# Patient Record
Sex: FEMALE | Race: WHITE | HISPANIC OR LATINO | Employment: OTHER | URBAN - METROPOLITAN AREA
[De-identification: names, ages, dates, MRNs, and addresses within clinical notes are randomized per-mention and may not be internally consistent; named-entity substitution may affect disease eponyms.]

---

## 2017-01-25 ENCOUNTER — OFFICE VISIT (OUTPATIENT)
Dept: URGENT CARE | Facility: PHYSICIAN GROUP | Age: 27
End: 2017-01-25
Payer: COMMERCIAL

## 2017-01-25 VITALS
WEIGHT: 135 LBS | HEART RATE: 78 BPM | BODY MASS INDEX: 24.84 KG/M2 | TEMPERATURE: 100.3 F | HEIGHT: 62 IN | OXYGEN SATURATION: 96 % | DIASTOLIC BLOOD PRESSURE: 70 MMHG | RESPIRATION RATE: 16 BRPM | SYSTOLIC BLOOD PRESSURE: 120 MMHG

## 2017-01-25 DIAGNOSIS — J10.1 INFLUENZA A: ICD-10-CM

## 2017-01-25 PROCEDURE — 99204 OFFICE O/P NEW MOD 45 MIN: CPT | Performed by: PHYSICIAN ASSISTANT

## 2017-01-25 RX ORDER — OSELTAMIVIR PHOSPHATE 75 MG/1
75 CAPSULE ORAL 2 TIMES DAILY
Qty: 10 CAP | Refills: 0 | Status: SHIPPED | OUTPATIENT
Start: 2017-01-25 | End: 2017-09-18

## 2017-01-25 RX ORDER — CODEINE PHOSPHATE AND GUAIFENESIN 10; 100 MG/5ML; MG/5ML
5 SOLUTION ORAL EVERY 4 HOURS PRN
Qty: 100 ML | Refills: 0 | Status: SHIPPED | OUTPATIENT
Start: 2017-01-25 | End: 2017-09-18

## 2017-01-25 ASSESSMENT — ENCOUNTER SYMPTOMS
PALPITATIONS: 0
SHORTNESS OF BREATH: 0
FEVER: 1
HEMOPTYSIS: 0
WHEEZING: 0
CHILLS: 1
SWEATS: 1
COUGH: 1
SORE THROAT: 1
SPUTUM PRODUCTION: 1
HEADACHES: 1

## 2017-01-25 ASSESSMENT — COPD QUESTIONNAIRES: COPD: 0

## 2017-01-25 NOTE — MR AVS SNAPSHOT
"Windy Funk   2017 3:45 PM   Office Visit   MRN: [id number]    Department:  [facility]   Dept Phone:  [phone]    Description:  Female : 1990   Provider:  Fuad Wiseman PA-C           Reason for Visit     Cough cough 2-3 days, fever      Allergies as of 2017     Allergen Noted Reactions    Percocet [Oxycodone-Acetaminophen] 2017   Hives      You were diagnosed with     Influenza A   [[id number]]         Vital Signs     Blood Pressure Pulse Temperature Respirations Height Weight    120/70 mmHg 78 37.9 °C (100.3 °F) 16 1.575 m (5' 2.01\") 61.236 kg (135 lb)    Body Mass Index Oxygen Saturation                24.69 kg/m2 96%          Basic Information     Date Of Birth Sex Race Ethnicity Preferred Language    1990 Female  or  Non- English      Health Maintenance     Patient has no pending health maintenance at this time      Current Immunizations     No immunizations on file.      Below and/or attached are the medications your provider expects you to take. Review all of your home medications and newly ordered medications with your provider and/or pharmacist. Follow medication instructions as directed by your provider and/or pharmacist. Please keep your medication list with you and share with your provider. Update the information when medications are discontinued, doses are changed, or new medications (including over-the-counter products) are added; and carry medication information at all times in the event of emergency situations     Allergies:  PERCOCET - Hives               Medications  Valid as of: 2017 -  6:11 PM    Generic Name Brand Name Tablet Size Instructions for use    Guaifenesin-Codeine (Solution) ROBITUSSIN -10 mg/5mL Take 5 mL by mouth every four hours as needed for Cough.        Oseltamivir Phosphate (Cap) TAMIFLU 75 MG Take 1 Cap by mouth 2 times a day.        .                 Medicines prescribed today " were sent to:     [id number] - [place], [place] - [id number] [facility] [place] [address]    Phone: [phone] Fax: [phone]    Open 24 Hours?: No      Medication refill instructions:       If your prescription bottle indicates you have medication refills left, it is not necessary to call your provider’s office. Please contact your pharmacy and they will refill your medication.    If your prescription bottle indicates you do not have any refills left, you may request refills at any time through one of the following ways: The online [employer] system (except Urgent Care), by calling your provider’s office, or by asking your pharmacy to contact your provider’s office with a refill request. Medication refills are processed only during regular business hours and may not be available until the next business day. Your provider may request additional information or to have a follow-up visit with you prior to refilling your medication.   *Please Note: Medication refills are assigned a new Rx number when refilled electronically. Your pharmacy may indicate that no refills were authorized even though a new prescription for the same medication is available at the pharmacy. Please request the medicine by name with the pharmacy before contacting your provider for a refill.           [employer] Access Code: [email or web address]  Expires: 2/24/2017  6:11 PM    [employer]  A secure, online tool to manage your health information     [employer]s [employer]® is a secure, online tool that connects you to your personalized health information from the privacy of your home -- day or night - making it very easy for you to manage your healthcare. Once the activation process is completed, you can even access your medical information using the [employer]err radames, which is available for free in the Apple Radames store or Google Play store.     W[employer] provides the following levels of access (as shown below):   My Chart Features    [facility] Doctor [facility]  Specialists [facility]  Urgent  Care Non-[facility]  Primary Care  Doctor   Email your healthcare team securely and privately 24/7 X X X    Manage appointments: schedule your next appointment; view details of past/upcoming appointments X      Request prescription refills. X      View recent personal medical records, including lab and immunizations X X X X   View health record, including health history, allergies, medications X X X X   Read reports about your outpatient visits, procedures, consult and ER notes X X X X   See your discharge summary, which is a recap of your hospital and/or ER visit that includes your diagnosis, lab results, and care plan. X X       How to register for [employer]:  1. Go to  [email or web address].  2. Click on the Sign Up Now box, which takes you to the New Member Sign Up page. You will need to provide the following information:  a. Enter your [employer] Access Code exactly as it appears at the top of this page. (You will not need to use this code after you’ve completed the sign-up process. If you do not sign up before the expiration date, you must request a new code.)   b. Enter your date of birth.   c. Enter your home email address.   d. Click Submit, and follow the next screen’s instructions.  3. Create a [employer] ID. This will be your [employer] login ID and cannot be changed, so think of one that is secure and easy to remember.  4. Create a [employer] password. You can change your password at any time.  5. Enter your Password Reset Question and Answer. This can be used at a later time if you forget your password.   6. Enter your e-mail address. This allows you to receive e-mail notifications when new information is available in [employer].  7. Click Sign Up. You can now view your health information.    For assistance activating your [employer] account, call [phone]

## 2017-01-25 NOTE — Clinical Note
January 25, 2017         Patient: Windy Funk   YOB: 1990   Date of Visit: 1/25/2017           To Whom it May Concern:    Windy Funk was seen in my clinic on 1/25/2017.  Please excuse her from work 1/25-1/27/2017    If you have any questions or concerns, please don't hesitate to call.        Sincerely,           Fuad Wiseman PA-C  Electronically Signed

## 2017-01-26 NOTE — PROGRESS NOTES
"Subjective:      Windy Funk is a 26 y.o. female who presents with Cough            Cough  This is a new problem. Episode onset: 2 days ago. The problem has been gradually worsening. The cough is productive of sputum. Associated symptoms include chills, ear pain, a fever, headaches, a sore throat and sweats. Pertinent negatives include no chest pain, hemoptysis, shortness of breath or wheezing. She has tried OTC cough suppressant for the symptoms. The treatment provided no relief. There is no history of asthma or COPD.       Review of Systems   Constitutional: Positive for fever, chills and malaise/fatigue.   HENT: Positive for congestion, ear pain and sore throat.    Respiratory: Positive for cough and sputum production. Negative for hemoptysis, shortness of breath and wheezing.    Cardiovascular: Negative for chest pain and palpitations.   Neurological: Positive for headaches.   All other systems reviewed and are negative.  PMH:  has no past medical history on file.  MEDS:   Current outpatient prescriptions:   •  guaifenesin-codeine (CHERATUSSIN AC) Solution oral solution, Take 5 mL by mouth every four hours as needed for Cough., Disp: 100 mL, Rfl: 0  •  oseltamivir (TAMIFLU) 75 MG Cap, Take 1 Cap by mouth 2 times a day., Disp: 10 Cap, Rfl: 0  ALLERGIES:   Allergies   Allergen Reactions   • Percocet [Oxycodone-Acetaminophen] Hives     SURGHX: History reviewed. No pertinent past surgical history.  SOCHX:    FH: Family history was reviewed, no pertinent findings to report  Medications, Allergies, and current problem list reviewed today in Epic       Objective:     /70 mmHg  Pulse 78  Temp(Src) 37.9 °C (100.3 °F)  Resp 16  Ht 1.575 m (5' 2.01\")  Wt 61.236 kg (135 lb)  BMI 24.69 kg/m2  SpO2 96%     Physical Exam   Constitutional: She is oriented to person, place, and time. She appears well-developed and well-nourished.   HENT:   Head: Normocephalic and atraumatic.   Right Ear: Hearing, " tympanic membrane, external ear and ear canal normal.   Left Ear: Hearing, tympanic membrane, external ear and ear canal normal.   Mouth/Throat: Uvula is midline, oropharynx is clear and moist and mucous membranes are normal.   Neck: Normal range of motion. Neck supple.   Cardiovascular: Normal rate, regular rhythm, normal heart sounds and intact distal pulses.  Exam reveals no gallop and no friction rub.    No murmur heard.  Pulmonary/Chest: Effort normal and breath sounds normal. No accessory muscle usage. No apnea, no tachypnea and no bradypnea. No respiratory distress. She has no decreased breath sounds. She has no wheezes. She has no rhonchi. She has no rales. She exhibits no tenderness.   Neurological: She is alert and oriented to person, place, and time.   Skin: Skin is warm and dry.   Psychiatric: She has a normal mood and affect. Her behavior is normal. Judgment and thought content normal.   Vitals reviewed.              Assessment/Plan:   Patient is a 26 year old female who presents with cough, fever, and malaise for 2 days.  PMH unremarkable.  Patient reports associated myalgia and headaches.  Vitals are significant for low-grade fever.  Lungs are clear to auscultation and ENT exam in unremarkable. Rapid flu is positive.  Rapid Flu Positive    1. Influenza A    - guaifenesin-codeine (CHERATUSSIN AC) Solution oral solution; Take 5 mL by mouth every four hours as needed for Cough.  Dispense: 100 mL; Refill: 0  - oseltamivir (TAMIFLU) 75 MG Cap; Take 1 Cap by mouth 2 times a day.  Dispense: 10 Cap; Refill: 0    Differential diagnosis, natural history, supportive care, and indications for immediate follow-up discussed at length.   Follow-up with primary care provider within 4-5 days, emergency room precautions discussed.  Patient and/or family appears understanding of information.

## 2017-09-18 ENCOUNTER — OFFICE VISIT (OUTPATIENT)
Dept: URGENT CARE | Facility: PHYSICIAN GROUP | Age: 27
End: 2017-09-18
Payer: COMMERCIAL

## 2017-09-18 VITALS
DIASTOLIC BLOOD PRESSURE: 70 MMHG | HEART RATE: 86 BPM | HEIGHT: 62 IN | BODY MASS INDEX: 24.84 KG/M2 | WEIGHT: 135 LBS | RESPIRATION RATE: 14 BRPM | TEMPERATURE: 98.2 F | SYSTOLIC BLOOD PRESSURE: 112 MMHG | OXYGEN SATURATION: 98 %

## 2017-09-18 DIAGNOSIS — M54.6 ACUTE LEFT-SIDED THORACIC BACK PAIN: ICD-10-CM

## 2017-09-18 DIAGNOSIS — M79.672 LEFT FOOT PAIN: ICD-10-CM

## 2017-09-18 LAB
APPEARANCE UR: NORMAL
BILIRUB UR STRIP-MCNC: NEGATIVE MG/DL
COLOR UR AUTO: YELLOW
GLUCOSE UR STRIP.AUTO-MCNC: NEGATIVE MG/DL
KETONES UR STRIP.AUTO-MCNC: NEGATIVE MG/DL
LEUKOCYTE ESTERASE UR QL STRIP.AUTO: NORMAL
NITRITE UR QL STRIP.AUTO: NEGATIVE
PH UR STRIP.AUTO: 6.5 [PH] (ref 5–8)
PROT UR QL STRIP: NEGATIVE MG/DL
RBC UR QL AUTO: NEGATIVE
SP GR UR STRIP.AUTO: 1.02
UROBILINOGEN UR STRIP-MCNC: NEGATIVE MG/DL

## 2017-09-18 PROCEDURE — 96372 THER/PROPH/DIAG INJ SC/IM: CPT | Performed by: FAMILY MEDICINE

## 2017-09-18 PROCEDURE — 81002 URINALYSIS NONAUTO W/O SCOPE: CPT | Performed by: FAMILY MEDICINE

## 2017-09-18 PROCEDURE — 99214 OFFICE O/P EST MOD 30 MIN: CPT | Mod: 25 | Performed by: FAMILY MEDICINE

## 2017-09-18 RX ORDER — PREDNISONE 20 MG/1
TABLET ORAL
Qty: 9 TAB | Refills: 0 | Status: SHIPPED | OUTPATIENT
Start: 2017-09-18 | End: 2017-09-27

## 2017-09-18 RX ORDER — KETOROLAC TROMETHAMINE 30 MG/ML
60 INJECTION, SOLUTION INTRAMUSCULAR; INTRAVENOUS ONCE
Status: COMPLETED | OUTPATIENT
Start: 2017-09-18 | End: 2017-09-18

## 2017-09-18 RX ADMIN — KETOROLAC TROMETHAMINE 60 MG: 30 INJECTION, SOLUTION INTRAMUSCULAR; INTRAVENOUS at 17:04

## 2017-09-18 ASSESSMENT — PAIN SCALES - GENERAL: PAINLEVEL: 6=MODERATE PAIN

## 2017-09-18 ASSESSMENT — PATIENT HEALTH QUESTIONNAIRE - PHQ9: CLINICAL INTERPRETATION OF PHQ2 SCORE: 0

## 2017-09-18 NOTE — LETTER
September 18, 2017         Patient: Windy Funk   YOB: 1990   Date of Visit: 9/18/2017           To Whom it May Concern:    Windy Funk was seen in my clinic on 9/18/2017.     Please excuse from work for 9/18 and 9/19/17 due to medical condition.    If you have any questions or concerns, please don't hesitate to call.        Sincerely,           Gildardo Armstrong M.D.  Electronically Signed

## 2017-09-18 NOTE — LETTER
September 18, 2017         Patient: Windy Funk   YOB: 1990   Date of Visit: 9/18/2017           To Whom it May Concern:    Windy Funk was seen in my clinic on 9/18/2017.    Please excuse from work for 9/18/17 due to medical condition.    If you have any questions or concerns, please don't hesitate to call.        Sincerely,           Gildardo Armstrong M.D.  Electronically Signed

## 2017-09-18 NOTE — PROGRESS NOTES
Chief Complaint:    Chief Complaint   Patient presents with   • Foot Problem     Left foot pain and left sidde back pain x1 week       History of Present Illness:    Girlfriend present. This is a new problem. Patient complains of severe pain on bottom of left mid foot that started yesterday AM. Was fine night before. Just woke up with the pain. No injury or trauma.  Took Ibuprofen which did not help. Also for 1 week, she has pain in left mid back. No injury or trauma. No radiating pain down left leg. No loss of bowel/bladder control. She reports her work is very physically demanding. No dysuria, urinary frequency, urinary urgency, or hematuria.      Review of Systems:    Constitutional: Negative for fever, chills, and diaphoresis.   Eyes: Negative for change in vision, photophobia, pain, redness, and discharge.  ENT: Negative for ear pain, ear discharge, hearing loss, tinnitus, nasal congestion, nosebleeds, and sore throat.    Respiratory: Negative for cough, hemoptysis, sputum production, shortness of breath, wheezing, and stridor.    Cardiovascular: Negative for chest pain, palpitations, orthopnea, claudication, leg swelling, and PND.   Gastrointestinal: Negative for abdominal pain, nausea, vomiting, diarrhea, constipation, blood in stool, and melena.   Genitourinary: Negative for dysuria, urinary urgency, urinary frequency, hematuria, and flank pain.   Musculoskeletal: See HPI.  Skin: Negative for rash and itching.   Neurological: Negative for dizziness, tingling, tremors, sensory change, speech change, focal weakness, seizures, loss of consciousness, and headaches.   Endo: Negative for polydipsia.   Heme: Does not bruise/bleed easily.   Psychiatric/Behavioral: Negative for depression, suicidal ideas, hallucinations, memory loss and substance abuse. The patient is not nervous/anxious and does not have insomnia.      Past Medical History:    No past medical history on file.    Past Surgical History:    No past  "surgical history on file.    Social History:    Social History     Social History   • Marital status: Single     Spouse name: N/A   • Number of children: N/A   • Years of education: N/A     Social History Main Topics   • Smoking status: Light Tobacco Smoker   • Smokeless tobacco: Never Used   • Alcohol use Yes      Comment: occ   • Drug use: No   • Sexual activity: Yes     Partners: Female     Other Topics Concern   • Not on file     Social History Narrative   • No narrative on file       Family History:    History reviewed. No pertinent family history.    Medications:    No current outpatient prescriptions on file prior to visit.     No current facility-administered medications on file prior to visit.        Allergies:    Allergies   Allergen Reactions   • Percocet [Oxycodone-Acetaminophen] Hives         Vitals:    Vitals:    09/18/17 1635   BP: 112/70   Pulse: 86   Resp: 14   Temp: 36.8 °C (98.2 °F)   SpO2: 98%   Weight: 61.2 kg (135 lb)   Height: 1.575 m (5' 2\")       Physical Exam:    Constitutional: Vital signs reviewed. Appears well-developed and well-nourished. No acute distress.   Eyes: Sclera white, conjunctivae clear.  ENT: External ears normal. Hearing normal.  Cardiovascular: Peripheral pulses 2+. No edema.   Pulmonary/Chest: Respirations non-labored.  Musculoskeletal: Antalgic gait due to left foot pain. Tender to palpation left lower thoracic region and left foot, plantar aspect of mid foot and arch of foot. Lumbar extension, rightward lateral bending at waist, and leftward rotation at waist reproduce pain in left thoracic back region. No muscular atrophy or weakness.  Neurological: Alert and oriented to person, place, and time. Muscle tone normal. Coordination normal. Light touch and sensation normal.   Skin: No rashes or lesions. Warm, dry, normal turgor.  Psychiatric: Normal mood and affect. Behavior is normal. Judgment and thought content normal.     Diagnostics:    POCT URINALYSIS (Order " #[id number]) on 9/18/17   Component Results     Component Value Ref Range & Units Status   POC Color Yellow Negative Final   POC Appearance Hazy Negative Final   POC Leukocyte Esterase Trace Negative Final   POC Nitrites Negative Negative Final   POC Urobiligen Negative Negative (0.2) mg/dL Final   POC Protein Negative Negative mg/dL Final   POC Urine PH 6.5 5.0 - 8.0 Final   POC Blood Negative Negative Final   POC Specific Gravity 1.020 <1.005 - >1.030 Final   POC Ketones Negative Negative mg/dL Final   POC Biliruben Negative Negative mg/dL Final   POC Glucose Negative Negative mg/dL Final   Last Resulted Time   Mon Sep 18, 2017  5:39 PM       Assessment / Plan:    1. Left foot pain  - ketorolac (TORADOL) injection 60 mg; 2 mL by Intramuscular route Once.  - predniSONE (DELTASONE) 20 MG Tab; 2 TABS ONCE A DAY ON DAYS 1-3, 1 TAB ONCE A DAY ON DAYS 4-6. TAKE WITH FOOD.  Dispense: 9 Tab; Refill: 0    2. Acute left-sided thoracic back pain  - POCT Urinalysis  - ketorolac (TORADOL) injection 60 mg; 2 mL by Intramuscular route Once.  - predniSONE (DELTASONE) 20 MG Tab; 2 TABS ONCE A DAY ON DAYS 1-3, 1 TAB ONCE A DAY ON DAYS 4-6. TAKE WITH FOOD.  Dispense: 9 Tab; Refill: 0      Work note given - excuse for 9/18 and 9/19/17.    Discussed with them DDX and management options.    Likely MSK inflammation.    Rec'd relative rest.    Agreeable to potent anti-inflammatory treatment with medications given and prescribed.    Follow-up with PCP or urgent care if getting worse or not better with above.

## 2017-09-27 ENCOUNTER — APPOINTMENT (OUTPATIENT)
Dept: RADIOLOGY | Facility: MEDICAL CENTER | Age: 27
End: 2017-09-27
Attending: EMERGENCY MEDICINE
Payer: COMMERCIAL

## 2017-09-27 ENCOUNTER — APPOINTMENT (OUTPATIENT)
Dept: RADIOLOGY | Facility: MEDICAL CENTER | Age: 27
End: 2017-09-27
Payer: COMMERCIAL

## 2017-09-27 ENCOUNTER — HOSPITAL ENCOUNTER (EMERGENCY)
Facility: MEDICAL CENTER | Age: 27
End: 2017-09-27
Attending: EMERGENCY MEDICINE
Payer: COMMERCIAL

## 2017-09-27 DIAGNOSIS — R07.9 CHEST PAIN, UNSPECIFIED TYPE: ICD-10-CM

## 2017-09-27 LAB
ALBUMIN SERPL BCP-MCNC: 3.3 G/DL (ref 3.2–4.9)
ALBUMIN/GLOB SERPL: 0.7 G/DL
ALP SERPL-CCNC: 78 U/L (ref 30–99)
ALT SERPL-CCNC: 9 U/L (ref 2–50)
ANION GAP SERPL CALC-SCNC: 9 MMOL/L (ref 0–11.9)
APPEARANCE UR: ABNORMAL
APTT PPP: 33 SEC (ref 24.7–36)
AST SERPL-CCNC: 16 U/L (ref 12–45)
BASOPHILS # BLD AUTO: 0.5 % (ref 0–1.8)
BASOPHILS # BLD: 0.05 K/UL (ref 0–0.12)
BILIRUB SERPL-MCNC: 0.2 MG/DL (ref 0.1–1.5)
BNP SERPL-MCNC: 4 PG/ML (ref 0–100)
BUN SERPL-MCNC: 17 MG/DL (ref 8–22)
CALCIUM SERPL-MCNC: 9.2 MG/DL (ref 8.5–10.5)
CHLORIDE SERPL-SCNC: 104 MMOL/L (ref 96–112)
CO2 SERPL-SCNC: 21 MMOL/L (ref 20–33)
COLOR UR AUTO: ABNORMAL
CREAT SERPL-MCNC: 0.74 MG/DL (ref 0.5–1.4)
EKG IMPRESSION: NORMAL
EKG IMPRESSION: NORMAL
EOSINOPHIL # BLD AUTO: 0.11 K/UL (ref 0–0.51)
EOSINOPHIL NFR BLD: 1.1 % (ref 0–6.9)
ERYTHROCYTE [DISTWIDTH] IN BLOOD BY AUTOMATED COUNT: 41.9 FL (ref 35.9–50)
GFR SERPL CREATININE-BSD FRML MDRD: >60 ML/MIN/1.73 M 2
GLOBULIN SER CALC-MCNC: 4.6 G/DL (ref 1.9–3.5)
GLUCOSE SERPL-MCNC: 60 MG/DL (ref 65–99)
GLUCOSE UR QL STRIP.AUTO: NEGATIVE MG/DL
HCG UR QL: NEGATIVE
HCT VFR BLD AUTO: 40.7 % (ref 37–47)
HGB BLD-MCNC: 13.9 G/DL (ref 12–16)
IMM GRANULOCYTES # BLD AUTO: 0.04 K/UL (ref 0–0.11)
IMM GRANULOCYTES NFR BLD AUTO: 0.4 % (ref 0–0.9)
INR PPP: 0.93 (ref 0.87–1.13)
KETONES UR QL STRIP.AUTO: ABNORMAL MG/DL
LEUKOCYTE ESTERASE UR QL STRIP.AUTO: ABNORMAL
LIPASE SERPL-CCNC: 24 U/L (ref 11–82)
LYMPHOCYTES # BLD AUTO: 3.2 K/UL (ref 1–4.8)
LYMPHOCYTES NFR BLD: 32.7 % (ref 22–41)
MCH RBC QN AUTO: 30.5 PG (ref 27–33)
MCHC RBC AUTO-ENTMCNC: 34.2 G/DL (ref 33.6–35)
MCV RBC AUTO: 89.3 FL (ref 81.4–97.8)
MONOCYTES # BLD AUTO: 0.85 K/UL (ref 0–0.85)
MONOCYTES NFR BLD AUTO: 8.7 % (ref 0–13.4)
NEUTROPHILS # BLD AUTO: 5.53 K/UL (ref 2–7.15)
NEUTROPHILS NFR BLD: 56.6 % (ref 44–72)
NITRITE UR QL STRIP.AUTO: NEGATIVE
NRBC # BLD AUTO: 0 K/UL
NRBC BLD AUTO-RTO: 0 /100 WBC
PH UR STRIP.AUTO: 7.5 [PH]
PLATELET # BLD AUTO: 468 K/UL (ref 164–446)
PMV BLD AUTO: 8.8 FL (ref 9–12.9)
POTASSIUM SERPL-SCNC: 3.6 MMOL/L (ref 3.6–5.5)
PROT SERPL-MCNC: 7.9 G/DL (ref 6–8.2)
PROT UR QL STRIP: NEGATIVE MG/DL
PROTHROMBIN TIME: 12.8 SEC (ref 12–14.6)
RBC # BLD AUTO: 4.56 M/UL (ref 4.2–5.4)
RBC UR QL AUTO: NEGATIVE
SODIUM SERPL-SCNC: 134 MMOL/L (ref 135–145)
SP GR UR: 1.02
TROPONIN I SERPL-MCNC: <0.01 NG/ML (ref 0–0.04)
TROPONIN I SERPL-MCNC: <0.01 NG/ML (ref 0–0.04)
WBC # BLD AUTO: 9.8 K/UL (ref 4.8–10.8)

## 2017-09-27 PROCEDURE — 81002 URINALYSIS NONAUTO W/O SCOPE: CPT

## 2017-09-27 PROCEDURE — 85025 COMPLETE CBC W/AUTO DIFF WBC: CPT

## 2017-09-27 PROCEDURE — 83690 ASSAY OF LIPASE: CPT

## 2017-09-27 PROCEDURE — 700111 HCHG RX REV CODE 636 W/ 250 OVERRIDE (IP): Performed by: EMERGENCY MEDICINE

## 2017-09-27 PROCEDURE — 83880 ASSAY OF NATRIURETIC PEPTIDE: CPT

## 2017-09-27 PROCEDURE — 93005 ELECTROCARDIOGRAM TRACING: CPT | Performed by: EMERGENCY MEDICINE

## 2017-09-27 PROCEDURE — A9270 NON-COVERED ITEM OR SERVICE: HCPCS | Performed by: EMERGENCY MEDICINE

## 2017-09-27 PROCEDURE — 81025 URINE PREGNANCY TEST: CPT

## 2017-09-27 PROCEDURE — 85730 THROMBOPLASTIN TIME PARTIAL: CPT

## 2017-09-27 PROCEDURE — 700102 HCHG RX REV CODE 250 W/ 637 OVERRIDE(OP): Performed by: EMERGENCY MEDICINE

## 2017-09-27 PROCEDURE — 71010 DX-CHEST-LIMITED (1 VIEW): CPT | Performed by: EMERGENCY MEDICINE

## 2017-09-27 PROCEDURE — 85610 PROTHROMBIN TIME: CPT

## 2017-09-27 PROCEDURE — 84484 ASSAY OF TROPONIN QUANT: CPT

## 2017-09-27 PROCEDURE — 93005 ELECTROCARDIOGRAM TRACING: CPT

## 2017-09-27 PROCEDURE — 99285 EMERGENCY DEPT VISIT HI MDM: CPT

## 2017-09-27 PROCEDURE — 96374 THER/PROPH/DIAG INJ IV PUSH: CPT

## 2017-09-27 PROCEDURE — 80053 COMPREHEN METABOLIC PANEL: CPT

## 2017-09-27 PROCEDURE — 71010 DX-CHEST-LIMITED (1 VIEW): CPT

## 2017-09-27 RX ORDER — ALBUTEROL SULFATE 90 UG/1
2 AEROSOL, METERED RESPIRATORY (INHALATION) ONCE
Status: COMPLETED | OUTPATIENT
Start: 2017-09-27 | End: 2017-09-27

## 2017-09-27 RX ORDER — KETOROLAC TROMETHAMINE 30 MG/ML
30 INJECTION, SOLUTION INTRAMUSCULAR; INTRAVENOUS ONCE
Status: COMPLETED | OUTPATIENT
Start: 2017-09-27 | End: 2017-09-27

## 2017-09-27 RX ORDER — NAPROXEN 500 MG/1
500 TABLET ORAL 2 TIMES DAILY WITH MEALS
Qty: 20 TAB | Refills: 3 | Status: SHIPPED | OUTPATIENT
Start: 2017-09-27 | End: 2017-10-07

## 2017-09-27 RX ADMIN — KETOROLAC TROMETHAMINE 30 MG: 30 INJECTION, SOLUTION INTRAMUSCULAR at 22:29

## 2017-09-27 RX ADMIN — ALBUTEROL SULFATE 2 PUFF: 90 AEROSOL, METERED RESPIRATORY (INHALATION) at 22:29

## 2017-09-27 ASSESSMENT — PAIN SCALES - GENERAL: PAINLEVEL_OUTOF10: 9

## 2017-09-28 VITALS
BODY MASS INDEX: 25.96 KG/M2 | WEIGHT: 141.09 LBS | HEART RATE: 81 BPM | OXYGEN SATURATION: 99 % | TEMPERATURE: 98.6 F | HEIGHT: 62 IN | SYSTOLIC BLOOD PRESSURE: 127 MMHG | DIASTOLIC BLOOD PRESSURE: 79 MMHG | RESPIRATION RATE: 18 BRPM

## 2017-09-28 NOTE — DISCHARGE INSTRUCTIONS
Chest Pain, Nonspecific  It is often hard to give a specific diagnosis for the cause of chest pain. There is always a chance that your pain could be related to something serious, like a heart attack or a blood clot in the lungs. You need to follow up with your caregiver for further evaluation. More lab tests or other studies such as X-rays, electrocardiography, stress testing, or cardiac imaging may be needed to find the cause of your pain.  Most of the time, nonspecific chest pain improves within 2 to 3 days with rest and mild pain medicine. For the next few days, avoid physical exertion or activities that bring on pain. Do not smoke. Avoid drinking alcohol. Call your caregiver for routine follow-up as advised.   SEEK IMMEDIATE MEDICAL CARE IF:  · You develop increased chest pain or pain that radiates to the arm, neck, jaw, back, or abdomen.   · You develop shortness of breath, increased coughing, or you start coughing up blood.   · You have severe back or abdominal pain, nausea, or vomiting.   · You develop severe weakness, fainting, fever, or chills.   Document Released: 12/18/2006 Document Revised: 03/11/2013 Document Reviewed: 06/06/2008  [employer]® Patient Information ©2013 [employer].

## 2017-09-28 NOTE — ED NOTES
Pt is placed on monitor. Pt VSS at this time. IV is established by this RN. Pt tolerated IV insertion well. Labs obtained and sent to lab. Pt updated on plan of care. Pt instructed on how to contact this RN and call bell placed in reach of patient. Pt verbalized understanding. Continue to monitor.

## 2017-09-28 NOTE — ED PROVIDER NOTES
ED Provider Note    ED Provider Note      Primary care provider: Pcp Pt States None    CHIEF COMPLAINT  Chief Complaint   Patient presents with   • Shortness of Breath     x 1 week getting worse    • Chest Pain     x 2 days    • Low Back Pain     RIGHT side    • Numbness     in LEFT shoulder and hand        HPI  Windy Funk is a 27 y.o. female who presents to the Emergency Department  Chief complaint of shortness of breath. Going on for 1 week and progressively worse over the last 2 days. She also states pain some pain in her right upper back and pain in the left anterior chest. Shortness of breath is worse when she lays down. She's had minimal cough nonproductive no fevers no chills. She reports no headache or altered mental status she reports no long periods of immobility no lower extremity edema there is no exertional component to her chest pain or shortness of breath. She is only a social smoker no hormone therapy. Pain is rated as a 5 out of 10 slightly worse with deep inspiration and palpation of her right upper back. No abdominal pain no nausea no vomiting constipation or diarrhea  no other acute concerns at this time.    REVIEW OF SYSTEMS  10 systems reviewed and otherwise negative, pertinent positives and negatives listed in the history of present illness.      PAST MEDICAL HISTORY   denies    SURGICAL HISTORY  patient denies any surgical history    SOCIAL HISTORY  Social History   Substance Use Topics   • Smoking status: Light Tobacco Smoker   • Smokeless tobacco: Never Used   • Alcohol use Yes      Comment: occ      History   Drug Use No       FAMILY HISTORY  Non-Contributory    CURRENT MEDICATIONS  Home Medications     Reviewed by Bisi Porras R.N. (Registered Nurse) on 09/27/17 at 2221  Med List Status: Not Addressed   Medication Last Dose Status   predniSONE (DELTASONE) 20 MG Tab  Active                ALLERGIES  Allergies   Allergen Reactions   • Percocet  "[Oxycodone-Acetaminophen] Hives       PHYSICAL EXAM  VITAL SIGNS: /79   Pulse 92   Temp 36.6 °C (97.8 °F)   Resp 18   Ht 1.575 m (5' 2\")   Wt 64 kg (141 lb 1.5 oz)   LMP 09/01/2017   SpO2 100%   BMI 25.81 kg/m²   Pulse ox interpretation: I interpret this pulse ox as normal.  Constitutional: Alert and oriented x 3, minimal Distress  HEENT: Atraumatic normocephalic, pupils are equal round reactive to light extraocular movements are intact. The nares is clear, external ears are normal, mouth shows moist mucous membranes  Neck: Supple, no JVD no tracheal deviation  Cardiovascular: Regular rate and rhythm no murmur rub or gallop 2+ pulses peripherally x4  Thorax & Lungs: No respiratory distress, no wheezes rales or rhonchi, No chest tenderness.   GI: Soft nontender nondistended positive bowel sounds, no peritoneal signs  Skin: Warm dry no acute rash or lesion  Musculoskeletal: Moving all extremities with full range and 5 of 5 strength, no acute  deformity  Neurologic: Cranial nerves III through XII are grossly intact, no sensory deficit, no cerebellar dysfunction   Psychiatric: Appropriate affect for situation at this time      DIAGNOSTIC STUDIES / PROCEDURES  LABS  Results for orders placed or performed during the hospital encounter of 09/27/17   Troponin   Result Value Ref Range    Troponin I <0.01 0.00 - 0.04 ng/mL   Btype Natriuretic Peptide   Result Value Ref Range    B Natriuretic Peptide 4 0 - 100 pg/mL   CBC with Differential   Result Value Ref Range    WBC 9.8 4.8 - 10.8 K/uL    RBC 4.56 4.20 - 5.40 M/uL    Hemoglobin 13.9 12.0 - 16.0 g/dL    Hematocrit 40.7 37.0 - 47.0 %    MCV 89.3 81.4 - 97.8 fL    MCH 30.5 27.0 - 33.0 pg    MCHC 34.2 33.6 - 35.0 g/dL    RDW 41.9 35.9 - 50.0 fL    Platelet Count 468 (H) 164 - 446 K/uL    MPV 8.8 (L) 9.0 - 12.9 fL    Neutrophils-Polys 56.60 44.00 - 72.00 %    Lymphocytes 32.70 22.00 - 41.00 %    Monocytes 8.70 0.00 - 13.40 %    Eosinophils 1.10 0.00 - 6.90 %    " Basophils 0.50 0.00 - 1.80 %    Immature Granulocytes 0.40 0.00 - 0.90 %    Nucleated RBC 0.00 /100 WBC    Neutrophils (Absolute) 5.53 2.00 - 7.15 K/uL    Lymphs (Absolute) 3.20 1.00 - 4.80 K/uL    Monos (Absolute) 0.85 0.00 - 0.85 K/uL    Eos (Absolute) 0.11 0.00 - 0.51 K/uL    Baso (Absolute) 0.05 0.00 - 0.12 K/uL    Immature Granulocytes (abs) 0.04 0.00 - 0.11 K/uL    NRBC (Absolute) 0.00 K/uL   Complete Metabolic Panel (CMP)   Result Value Ref Range    Sodium 134 (L) 135 - 145 mmol/L    Potassium 3.6 3.6 - 5.5 mmol/L    Chloride 104 96 - 112 mmol/L    Co2 21 20 - 33 mmol/L    Anion Gap 9.0 0.0 - 11.9    Glucose 60 (L) 65 - 99 mg/dL    Bun 17 8 - 22 mg/dL    Creatinine 0.74 0.50 - 1.40 mg/dL    Calcium 9.2 8.5 - 10.5 mg/dL    AST(SGOT) 16 12 - 45 U/L    ALT(SGPT) 9 2 - 50 U/L    Alkaline Phosphatase 78 30 - 99 U/L    Total Bilirubin 0.2 0.1 - 1.5 mg/dL    Albumin 3.3 3.2 - 4.9 g/dL    Total Protein 7.9 6.0 - 8.2 g/dL    Globulin 4.6 (H) 1.9 - 3.5 g/dL    A-G Ratio 0.7 g/dL   Prothrombin Time   Result Value Ref Range    PT 12.8 12.0 - 14.6 sec    INR 0.93 0.87 - 1.13   APTT   Result Value Ref Range    APTT 33.0 24.7 - 36.0 sec   Lipase   Result Value Ref Range    Lipase 24 11 - 82 U/L   ESTIMATED GFR   Result Value Ref Range    GFR If African American >60 >60 mL/min/1.73 m 2    GFR If Non African American >60 >60 mL/min/1.73 m 2   EKG (ER)   Result Value Ref Range    Report       [facility] Emergency Dept.    Test Date:  2017  Pt Name:    OZ WISEMAN            Department: ER  MRN:        [id number]                      Room:  Gender:     F                            Technician: [id number]  :        1990                   Requested By:ER TRIAGE PROTOCOL  Order #:    [id number]                    Reading MD:    Measurements  Intervals                                Axis  Rate:       89                           P:          52  SD:         136                          QRS:         25  QRSD:       96                           T:          7  QT:         364  QTc:        443    Interpretive Statements  SINUS RHYTHM  PROBABLE LEFT ATRIAL ABNORMALITY  BORDERLINE T ABNORMALITIES, ANTERIOR LEADS  No previous ECG available for comparison         All labs reviewed by me.    EKG Interpretation  Interpreted by me    Normal sinus rhythm at a rate of 89, no ST elevation or ST depression there is isolated T-wave inversion in V3 no other acute ischemic or rhythmic abnormalities. Normal axis normal intervals appropriate R-wave progression.    RADIOLOGY  DX-CHEST-LIMITED (1 VIEW)    (Results Pending)     The radiologist's interpretation of all radiological studies have been reviewed by me.    COURSE & MEDICAL DECISION MAKING  Pertinent Labs & Imaging studies reviewed. (See chart for details)    10:25 PM - Patient seen and examined at bedside. Patient will be treated withAlbuterol, Toradol. Ordered protocol labs and imaging as above to evaluate her symptoms. Triage protocol shows normal troponin chest x-ray shows no focal consolidation or other acute cardiopulmonary process. Her EKG has no isolated T-wave inversion in V3 no other acute changes. We will observe for alleviation of symptoms with medications and repeat troponin and EKG. Patient has no tachycardia no hypoxia and no lower extremity edema and no other risk factors for pulmonary embolism with the exception of occasional tobacco smoking. I don't feel as though it is appropriate to perform CT of the chest at this time. She has no tearing sensation and pulses C no stigmata of aortic abnormality on chest x-ray.         Patient will be discharged with albuterol inhaler and anti-inflammatories return for worsening symptoms or concerns otherwise follow-up with primary care physician as needed.    Prescription monitoring program queried and unremarkable.    Patient noted to have slightly elevated blood pressure likely circumstantial secondary to presenting  "complaint. Referred to primary care physician for further evaluation.        Repeat EKG and troponin unremarkable patient feeling better after Toradol. Be prescribed naproxen for home use return for any worsening symptoms or concerns discharged home in stable condition  /79   Pulse 81   Temp 37 °C (98.6 °F)   Resp 18   Ht 1.575 m (5' 2\")   Wt 64 kg (141 lb 1.5 oz)   LMP 09/01/2017   SpO2 99%   BMI 25.81 kg/m²     Pcp Pt States None    In 2 days      [facility], Emergency Dept  [address] [address] [phone]    in 12-24 hours if symptoms persist,, immediately if symptoms worsen            FINAL IMPRESSION  1. Chest pain, unspecified type    2. costochondritis       This dictation has been created using voice recognition software and/or scribes. The accuracy of the dictation is limited by the abilities of the software and the expertise of the scribes. I expect there may be some errors of grammar and possibly content. I made every attempt to manually correct the errors within my dictation. However, errors related to voice recognition software and/or scribes may still exist and should be interpreted within the appropriate context.            "

## 2017-09-28 NOTE — ED NOTES
Patient is medicated as ordered by provider. Pt educated regarding medication administered by this RN, and patient verbalized understanding. Pt has no further questions at this time. Continue to monitor.

## 2017-09-28 NOTE — ED NOTES
"Triage notes  Pt c/o SOB x 1 week and chest pressure x 2 days that has been getting worse.  Pt also c/o numbness in LEFT shoulder and hand.  EKG done in triage as well as labs sent.    .Informed of triage process. Awaiting room in triage lobby. Asked to return to triage desk with any questions or concerns.       .  Chief Complaint   Patient presents with   • Shortness of Breath     x 1 week getting worse    • Chest Pain     x 2 days    • Low Back Pain     RIGHT side    • Numbness     in LEFT shoulder and hand      ./79   Pulse 92   Temp 36.6 °C (97.8 °F)   Resp 18   Ht 1.575 m (5' 2\")   Wt 64 kg (141 lb 1.5 oz)   LMP 09/01/2017   SpO2 100%   BMI 25.81 kg/m²     "

## 2018-04-26 ENCOUNTER — OFFICE VISIT (OUTPATIENT)
Dept: URGENT CARE | Facility: CLINIC | Age: 28
End: 2018-04-26
Payer: COMMERCIAL

## 2018-04-26 VITALS
WEIGHT: 138 LBS | HEIGHT: 62 IN | BODY MASS INDEX: 25.4 KG/M2 | OXYGEN SATURATION: 98 % | HEART RATE: 79 BPM | DIASTOLIC BLOOD PRESSURE: 70 MMHG | RESPIRATION RATE: 16 BRPM | SYSTOLIC BLOOD PRESSURE: 110 MMHG | TEMPERATURE: 97.9 F

## 2018-04-26 DIAGNOSIS — R11.0 NAUSEA: ICD-10-CM

## 2018-04-26 DIAGNOSIS — K29.00 OTHER ACUTE GASTRITIS WITHOUT HEMORRHAGE: ICD-10-CM

## 2018-04-26 LAB
APPEARANCE UR: CLEAR
BILIRUB UR STRIP-MCNC: NEGATIVE MG/DL
COLOR UR AUTO: YELLOW
GLUCOSE UR STRIP.AUTO-MCNC: NEGATIVE MG/DL
INT CON NEG: NEGATIVE
INT CON POS: POSITIVE
KETONES UR STRIP.AUTO-MCNC: NEGATIVE MG/DL
LEUKOCYTE ESTERASE UR QL STRIP.AUTO: NORMAL
NITRITE UR QL STRIP.AUTO: NEGATIVE
PH UR STRIP.AUTO: 7.5 [PH] (ref 5–8)
POC URINE PREGNANCY TEST: NEGATIVE
PROT UR QL STRIP: NORMAL MG/DL
RBC UR QL AUTO: NEGATIVE
SP GR UR STRIP.AUTO: 1.01
UROBILINOGEN UR STRIP-MCNC: NEGATIVE MG/DL

## 2018-04-26 PROCEDURE — 81025 URINE PREGNANCY TEST: CPT | Performed by: PHYSICIAN ASSISTANT

## 2018-04-26 PROCEDURE — 81002 URINALYSIS NONAUTO W/O SCOPE: CPT | Performed by: PHYSICIAN ASSISTANT

## 2018-04-26 PROCEDURE — 99214 OFFICE O/P EST MOD 30 MIN: CPT | Performed by: PHYSICIAN ASSISTANT

## 2018-04-26 RX ORDER — OMEPRAZOLE 20 MG/1
20 CAPSULE, DELAYED RELEASE ORAL DAILY
Qty: 30 CAP | Refills: 0 | Status: SHIPPED | OUTPATIENT
Start: 2018-04-26 | End: 2021-01-20

## 2018-04-26 RX ORDER — ONDANSETRON 4 MG/1
4 TABLET, FILM COATED ORAL EVERY 4 HOURS PRN
Qty: 20 TAB | Refills: 0 | Status: SHIPPED | OUTPATIENT
Start: 2018-04-26 | End: 2021-01-20

## 2018-04-26 ASSESSMENT — ENCOUNTER SYMPTOMS
FLANK PAIN: 0
ABDOMINAL PAIN: 1
DIZZINESS: 0
WEIGHT LOSS: 0
VOMITING: 0
BLOOD IN STOOL: 0
CONSTIPATION: 0
MUSCULOSKELETAL NEGATIVE: 1
DIARRHEA: 0
SHORTNESS OF BREATH: 0
CHILLS: 0
NAUSEA: 1
FEVER: 0

## 2018-04-26 NOTE — LETTER
April 26, 2018         Patient: Windy Funk   YOB: 1990   Date of Visit: 4/26/2018           To Whom it May Concern:    Windy Funk was seen in my clinic on 4/26/2018. Please excuse her absence from 4/25/18-4/27/18.    If you have any questions or concerns, please don't hesitate to call.        Sincerely,           Mayda Buck P.A.-C.  Electronically Signed

## 2018-04-26 NOTE — PROGRESS NOTES
Subjective:      Windy Funk is a 27 y.o. female who presents with Abdominal Pain (nausea x 4 days)            Abdominal Pain   This is a new problem. The current episode started in the past 7 days (4 days). The onset quality is gradual. The problem occurs intermittently. The problem has been waxing and waning. The pain is located in the epigastric region. The pain is at a severity of 3/10. The pain is moderate. The quality of the pain is colicky. The abdominal pain does not radiate. Associated symptoms include nausea. Pertinent negatives include no constipation, diarrhea, dysuria, fever, frequency, hematuria, vomiting or weight loss. The pain is aggravated by certain positions and palpation. The pain is relieved by certain positions. She has tried nothing for the symptoms. There is no history of abdominal surgery, gallstones, GERD, irritable bowel syndrome or PUD.     Patient presents to urgent care reporting a 4 day history of intermittent epigastric abdominal pain with associated nausea, no vomiting. She reports the pain is worse when lying supine and described as sharp and stabbing. She denies fevers, chills, body aches, urinary symptoms, back pain, constipation, diarrhea, or bloody stools. No history of abdominal surgeries. No history of gallstones, pancreatitis, or kidney stones. She has approximately 5 drinks a week. She does report taking ibuprofen regularly 1-2 times daily for her back pain and states she takes 1000 mg per dose.     Review of Systems   Constitutional: Negative for chills, fever and weight loss.   HENT: Negative for congestion.    Respiratory: Negative for shortness of breath.    Cardiovascular: Negative for chest pain.   Gastrointestinal: Positive for abdominal pain and nausea. Negative for blood in stool, constipation, diarrhea and vomiting.   Genitourinary: Negative.  Negative for dysuria, flank pain, frequency, hematuria and urgency.   Musculoskeletal: Negative.     "  Neurological: Negative for dizziness.        Objective:     /70   Pulse 79   Temp 36.6 °C (97.9 °F)   Resp 16   Ht 1.575 m (5' 2\")   Wt 62.6 kg (138 lb)   SpO2 98%   BMI 25.24 kg/m²      Physical Exam   Constitutional: She is oriented to person, place, and time. She appears well-developed and well-nourished. No distress.   HENT:   Head: Normocephalic and atraumatic.   Eyes: Pupils are equal, round, and reactive to light.   Neck: Normal range of motion.   Cardiovascular: Normal rate.    Pulmonary/Chest: Effort normal.   Abdominal: Soft. Normal appearance and bowel sounds are normal. She exhibits no distension and no mass. There is tenderness in the epigastric area. There is no rigidity, no rebound, no guarding, no CVA tenderness, no tenderness at McBurney's point and negative Angeles's sign.       No CVAT bilaterally   Musculoskeletal: Normal range of motion.   Neurological: She is alert and oriented to person, place, and time.   Skin: Skin is warm and dry. She is not diaphoretic.   Psychiatric: She has a normal mood and affect. Her behavior is normal.   Nursing note and vitals reviewed.              PMH:  has no past medical history on file.  MEDS:   Current Outpatient Prescriptions:   •  omeprazole (PRILOSEC) 20 MG delayed-release capsule, Take 1 Cap by mouth every day., Disp: 30 Cap, Rfl: 0  •  ondansetron (ZOFRAN) 4 MG Tab tablet, Take 1 Tab by mouth every four hours as needed for Nausea/Vomiting., Disp: 20 Tab, Rfl: 0  ALLERGIES:   Allergies   Allergen Reactions   • Percocet [Oxycodone-Acetaminophen] Hives     SURGHX: History reviewed. No pertinent surgical history.  SOCHX:  reports that she has been smoking.  She has never used smokeless tobacco. She reports that she drinks alcohol. She reports that she does not use drugs.  FH: family history is not on file.    POCT Urinalysis:  Component Results     Component Value Ref Range & Units Status   POC Color YELLOW  Negative Final   POC Appearance CLEAR "  Negative Final   POC Leukocyte Esterase TRACE  Negative Final   POC Nitrites NEGATIVE  Negative Final   POC Urobiligen NEGATIVE  Negative (0.2) mg/dL Final   POC Protein TRACE  Negative mg/dL Final   POC Urine PH 7.5  5.0 - 8.0 Final   POC Blood NEGATIVE  Negative Final   POC Specific Gravity 1.010  <1.005 - >1.030 Final   POC Ketones NEGATIVE  Negative mg/dL Final   POC Bilirubin NEGATIVE  Negative mg/dL Final   POC Glucose NEGATIVE  Negative mg/dL Final   Last Resulted Time   Thu Apr 26, 2018 12:28 PM       Assessment/Plan:     1. Other acute gastritis without hemorrhage  - POCT Urinalysis --> trace leuks and protein, otherwise normal  - POCT Pregnancy --> negative  - omeprazole (PRILOSEC) 20 MG delayed-release capsule; Take 1 Cap by mouth every day.  Dispense: 30 Cap; Refill: 0    Patient given GI cocktail in clinic with complete resolution of pain. Likely gastritis secondary to high dose ibuprofen. Advised to stop taking nsaids for at least 2 weeks. Prilosec 20 mg daily for 2-4 weeks. Discussed bland diet. Call or return to office if symptoms persist or worsen. The patient demonstrated a good understanding and agreed with the treatment plan.    2. Nausea  - ondansetron (ZOFRAN) 4 MG Tab tablet; Take 1 Tab by mouth every four hours as needed for Nausea/Vomiting.  Dispense: 20 Tab; Refill: 0

## 2019-10-27 ENCOUNTER — OFFICE VISIT (OUTPATIENT)
Dept: URGENT CARE | Facility: CLINIC | Age: 29
End: 2019-10-27
Payer: COMMERCIAL

## 2019-10-27 ENCOUNTER — APPOINTMENT (OUTPATIENT)
Dept: RADIOLOGY | Facility: IMAGING CENTER | Age: 29
End: 2019-10-27
Attending: PHYSICIAN ASSISTANT
Payer: COMMERCIAL

## 2019-10-27 VITALS
OXYGEN SATURATION: 99 % | TEMPERATURE: 98.8 F | BODY MASS INDEX: 28.13 KG/M2 | HEIGHT: 61 IN | DIASTOLIC BLOOD PRESSURE: 72 MMHG | HEART RATE: 73 BPM | SYSTOLIC BLOOD PRESSURE: 112 MMHG | RESPIRATION RATE: 12 BRPM | WEIGHT: 149 LBS

## 2019-10-27 DIAGNOSIS — S80.02XA CONTUSION OF LEFT KNEE, INITIAL ENCOUNTER: ICD-10-CM

## 2019-10-27 DIAGNOSIS — S89.92XA INJURY OF LEFT KNEE, INITIAL ENCOUNTER: ICD-10-CM

## 2019-10-27 PROCEDURE — 99213 OFFICE O/P EST LOW 20 MIN: CPT | Performed by: PHYSICIAN ASSISTANT

## 2019-10-27 PROCEDURE — 73564 X-RAY EXAM KNEE 4 OR MORE: CPT | Mod: TC,LT | Performed by: PHYSICIAN ASSISTANT

## 2019-10-27 ASSESSMENT — ENCOUNTER SYMPTOMS
NAUSEA: 0
SORE THROAT: 0
EYE REDNESS: 0
JOINT SWELLING: 1
HEADACHES: 1
COUGH: 0
FEVER: 0
VOMITING: 0
EYE DISCHARGE: 0
SHORTNESS OF BREATH: 0

## 2019-10-27 NOTE — LETTER
RAD[facility]  [facility]  [address] [address]     October 27, 2019    Patient: Windy Funk   YOB: 1990   Date of Visit: 10/27/2019       To Whom It May Concern:    Windy Funk was seen and treated in our department on 10/27/2019. Please excuse the patient from work 10/28 and 10/29.     Sincerely,     Dulce Ordaz P.A.-C.

## 2019-10-28 NOTE — PROGRESS NOTES
Subjective:      Windy Funk is a 29 y.o. female who presents with Knee Pain (x 3 days.  Pt. fell in her house on Friday and she landed on L knee.  She is having pain, bruisind and swelling of L knee. No prior injury.)        Knee Pain   This is a new problem. Episode onset: x 3 days ago. Associated symptoms include headaches and joint swelling. Pertinent negatives include no chest pain, congestion, coughing, fever, nausea, rash, sore throat or vomiting. The symptoms are aggravated by walking. She has tried NSAIDs for the symptoms. The treatment provided mild relief.     The patient presents to clinic c/o left knee pain x 3 days. The patient states she tripped on a rug, which caused her to fall. The patient states she fell forward, landing on her left knee. The patient reports associated swelling and bruising to her left knee. The patient also reports associated decreased ROM. The patient notes increased pain with walking. The patient denies numbness, tingling, and weakness. The patient has taken IBU for her current symptoms.       PMH:  has no past medical history on file.  MEDS:   Current Outpatient Medications:   •  omeprazole (PRILOSEC) 20 MG delayed-release capsule, Take 1 Cap by mouth every day., Disp: 30 Cap, Rfl: 0  •  ondansetron (ZOFRAN) 4 MG Tab tablet, Take 1 Tab by mouth every four hours as needed for Nausea/Vomiting., Disp: 20 Tab, Rfl: 0  ALLERGIES:   Allergies   Allergen Reactions   • Percocet [Oxycodone-Acetaminophen] Hives     SURGHX: No past surgical history on file.  SOCHX:  reports that she has been smoking. She has never used smokeless tobacco. She reports that she drinks alcohol. She reports that she does not use drugs.  FH: Family history was reviewed, no pertinent findings to report      Review of Systems   Constitutional: Negative for fever.   HENT: Negative for congestion, ear pain and sore throat.    Eyes: Negative for discharge and redness.   Respiratory: Negative for cough  "and shortness of breath.    Cardiovascular: Negative for chest pain and leg swelling.   Gastrointestinal: Negative for nausea and vomiting.   Musculoskeletal: Positive for joint pain and joint swelling.        + left knee   Skin: Negative for rash.   Neurological: Positive for headaches.          Objective:     /72   Pulse 73   Temp 37.1 °C (98.8 °F) (Temporal)   Resp 12   Ht 1.549 m (5' 1\")   Wt 67.6 kg (149 lb)   LMP 10/14/2019   SpO2 99%   BMI 28.15 kg/m²      Physical Exam   Constitutional: She is oriented to person, place, and time. She appears well-developed and well-nourished. No distress.   HENT:   Head: Normocephalic and atraumatic.   Nose: Nose normal.   Eyes: Conjunctivae and EOM are normal.   Neck: Normal range of motion. Neck supple.   Cardiovascular: Normal rate.   Pulmonary/Chest: Effort normal.   Musculoskeletal:   Left Knee:  Tenderness to the left knee over lying the patella with associated swelling and ecchymosis.  Decreased ROM -secondary to pain  Neurovascular intact  Decreased strength with flexion/extension of the left knee secondary to pain  Antalgic gait   Neurological: She is alert and oriented to person, place, and time.   Skin: Skin is warm and dry.          Progress:  Left Knee XR:  FINDINGS:  The bony structures are intact, with no fracture or dislocation.      Impression       No acute bony abnormality.          Assessment/Plan:     1. Injury of left knee, initial encounter  - DX-KNEE COMPLETE 4+ LEFT; Future    2. Contusion of left knee, initial encounter    Differential diagnoses, supportive care, and indications for immediate follow-up discussed with patient.   Instructed to return to clinic or nearest emergency department for any change in condition, further concerns, or worsening of symptoms.    OTC NSAIDs for pain/discomfort   RICE  Wear brace for additional support  Weight-bearing as tolerated  Follow-up with PCP   Return to clinic or go tot he ED if symptoms " worsen or fail to improve, or if the patient should develop worsening/increasing pain/tenderness, swelling, bruising, redness or warmth to the affected area, decreased ROM, numbness, tingling or weakness, difficulty walking, fever/chills, and/or any concerning symptoms.     Discussed plan with the patient, and she agrees to the above.

## 2020-01-13 ENCOUNTER — OFFICE VISIT (OUTPATIENT)
Dept: URGENT CARE | Facility: PHYSICIAN GROUP | Age: 30
End: 2020-01-13
Payer: COMMERCIAL

## 2020-01-13 VITALS
WEIGHT: 144 LBS | DIASTOLIC BLOOD PRESSURE: 82 MMHG | OXYGEN SATURATION: 96 % | SYSTOLIC BLOOD PRESSURE: 124 MMHG | TEMPERATURE: 98.2 F | BODY MASS INDEX: 27.19 KG/M2 | HEART RATE: 80 BPM | RESPIRATION RATE: 18 BRPM | HEIGHT: 61 IN

## 2020-01-13 DIAGNOSIS — R68.89 FLU-LIKE SYMPTOMS: ICD-10-CM

## 2020-01-13 LAB
FLUAV+FLUBV AG SPEC QL IA: NEGATIVE
INT CON NEG: NEGATIVE
INT CON POS: POSITIVE

## 2020-01-13 PROCEDURE — 99214 OFFICE O/P EST MOD 30 MIN: CPT | Performed by: FAMILY MEDICINE

## 2020-01-13 PROCEDURE — 87804 INFLUENZA ASSAY W/OPTIC: CPT | Performed by: FAMILY MEDICINE

## 2020-01-13 RX ORDER — IBUPROFEN 200 MG
200 TABLET ORAL EVERY 6 HOURS PRN
COMMUNITY
End: 2021-01-20

## 2020-01-13 RX ORDER — FLUTICASONE PROPIONATE 50 MCG
1 SPRAY, SUSPENSION (ML) NASAL DAILY
Qty: 16 G | Refills: 0 | Status: SHIPPED | OUTPATIENT
Start: 2020-01-13 | End: 2021-01-20

## 2020-01-13 ASSESSMENT — ENCOUNTER SYMPTOMS
NAUSEA: 0
HEADACHES: 0
MYALGIAS: 1
DIARRHEA: 0
COUGH: 1
SORE THROAT: 1
VOMITING: 0
FEVER: 1
SHORTNESS OF BREATH: 0
ABDOMINAL PAIN: 0

## 2020-01-13 NOTE — LETTER
January 13, 2020      To Whom It May Concern:           This is confirmation that Windy Funk attended her scheduled appointment with Philip Coburn M.D. on 1/13/20.  Please excuse her from work today.  She is anticipated to be well enough to return to work by 1/16/2020.             If you have any questions please do not hesitate to call me at the phone number listed below.      Sincerely,          Philip Coburn M.D.  [phone]

## 2020-01-13 NOTE — PROGRESS NOTES
Subjective:     Windy Funk is a 29 y.o. female who presents for Cough (cough x 5 days, hot and cold flashes, fever, bodyache, sore throat , chest congestion, nasal congestion, pt took ibuprofen for fever at 10 am )    HPI  Pt presents for evaluation of a new problem  Pt ill for the past 5 days   Having fevers, sore throat, cough, and body aches   Body aches are constant and all over   Nasal congestion is constant   Taking ibuprofen for fevers and helps   Cough is constant and not imrpvong   Cough is worse at night   Cough is dry and nonproductive   Taking Robitussin DM and not helping cough     Review of Systems   Constitutional: Positive for fever and malaise/fatigue.   HENT: Positive for congestion and sore throat.    Respiratory: Positive for cough. Negative for shortness of breath.    Cardiovascular: Negative for chest pain.   Gastrointestinal: Negative for abdominal pain, diarrhea, nausea and vomiting.   Musculoskeletal: Positive for myalgias.   Skin: Negative for rash.   Neurological: Negative for headaches.       PMH: No hx of asthma   MEDS:   Current Outpatient Medications:   •  Phenylephrine-DM-GG (ROBITUSSIN COUGH/COLD CF PO), Take  by mouth., Disp: , Rfl:   •  ibuprofen (MOTRIN) 200 MG Tab, Take 200 mg by mouth every 6 hours as needed., Disp: , Rfl:   •  omeprazole (PRILOSEC) 20 MG delayed-release capsule, Take 1 Cap by mouth every day. (Patient not taking: Reported on 1/13/2020), Disp: 30 Cap, Rfl: 0  •  ondansetron (ZOFRAN) 4 MG Tab tablet, Take 1 Tab by mouth every four hours as needed for Nausea/Vomiting. (Patient not taking: Reported on 1/13/2020), Disp: 20 Tab, Rfl: 0  ALLERGIES:   Allergies   Allergen Reactions   • Percocet [Oxycodone-Acetaminophen] Hives     SURGHX: History reviewed. No pertinent surgical history.  SOCHX:  reports that she has been smoking. She has never used smokeless tobacco. She reports current alcohol use. She reports that she does not use drugs.  FH: Family  "history was reviewed, not contributing to acute illness     Objective:   /82 (BP Location: Right arm, Patient Position: Sitting, BP Cuff Size: Adult)   Pulse 80   Temp 36.8 °C (98.2 °F) (Temporal)   Resp 18   Ht 1.549 m (5' 1\")   Wt 65.3 kg (144 lb)   SpO2 96%   BMI 27.21 kg/m²     Physical Exam  Constitutional:       General: She is not in acute distress.     Appearance: She is well-developed. She is not diaphoretic.   HENT:      Head: Normocephalic and atraumatic.      Right Ear: Ear canal and external ear normal.      Left Ear: Ear canal and external ear normal.      Ears:      Comments: Clear effusion bilaterally     Nose: Congestion and rhinorrhea present.      Mouth/Throat:      Mouth: Mucous membranes are moist.      Pharynx: Oropharynx is clear. No oropharyngeal exudate or posterior oropharyngeal erythema.   Eyes:      General: No scleral icterus.        Right eye: No discharge.         Left eye: No discharge.      Conjunctiva/sclera: Conjunctivae normal.   Neck:      Musculoskeletal: Normal range of motion.      Trachea: No tracheal deviation.   Cardiovascular:      Rate and Rhythm: Normal rate and regular rhythm.   Pulmonary:      Effort: Pulmonary effort is normal. No respiratory distress.      Breath sounds: Normal breath sounds. No wheezing or rales.   Skin:     General: Skin is warm and dry.      Findings: No rash.   Neurological:      Mental Status: She is alert and oriented to person, place, and time.   Psychiatric:         Mood and Affect: Mood normal.         Behavior: Behavior normal.         Thought Content: Thought content normal.         Judgment: Judgment normal.       Assessment/Plan:   Assessment    1. Flu-like symptoms  - fluticasone (FLONASE) 50 MCG/ACT nasal spray; Spray 1 Spray in nose every day.  Dispense: 16 g; Refill: 0    Patient is a 29-year-old female with flulike illness.  Outside the treatment window for influenza.  Will treat with supportive care measures including " Flonase, Claritin, and over-the-counter cough medications.  Reviewed expected course of illness and follow-up precautions.  Follow-up as needed.

## 2021-01-20 ENCOUNTER — HOSPITAL ENCOUNTER (EMERGENCY)
Facility: MEDICAL CENTER | Age: 31
End: 2021-01-20
Attending: EMERGENCY MEDICINE
Payer: COMMERCIAL

## 2021-01-20 ENCOUNTER — APPOINTMENT (OUTPATIENT)
Dept: RADIOLOGY | Facility: MEDICAL CENTER | Age: 31
End: 2021-01-20
Attending: EMERGENCY MEDICINE
Payer: COMMERCIAL

## 2021-01-20 VITALS
BODY MASS INDEX: 26.24 KG/M2 | RESPIRATION RATE: 18 BRPM | OXYGEN SATURATION: 98 % | DIASTOLIC BLOOD PRESSURE: 69 MMHG | HEIGHT: 61 IN | SYSTOLIC BLOOD PRESSURE: 116 MMHG | WEIGHT: 139 LBS | HEART RATE: 70 BPM | TEMPERATURE: 98.3 F

## 2021-01-20 DIAGNOSIS — S09.90XA CLOSED HEAD INJURY, INITIAL ENCOUNTER: ICD-10-CM

## 2021-01-20 DIAGNOSIS — S16.1XXA STRAIN OF NECK MUSCLE, INITIAL ENCOUNTER: ICD-10-CM

## 2021-01-20 PROCEDURE — 72125 CT NECK SPINE W/O DYE: CPT

## 2021-01-20 PROCEDURE — 96372 THER/PROPH/DIAG INJ SC/IM: CPT

## 2021-01-20 PROCEDURE — 99284 EMERGENCY DEPT VISIT MOD MDM: CPT

## 2021-01-20 PROCEDURE — 700111 HCHG RX REV CODE 636 W/ 250 OVERRIDE (IP): Performed by: EMERGENCY MEDICINE

## 2021-01-20 PROCEDURE — 70450 CT HEAD/BRAIN W/O DYE: CPT

## 2021-01-20 RX ORDER — MORPHINE SULFATE 4 MG/ML
4 INJECTION, SOLUTION INTRAMUSCULAR; INTRAVENOUS ONCE
Status: COMPLETED | OUTPATIENT
Start: 2021-01-20 | End: 2021-01-20

## 2021-01-20 RX ORDER — ONDANSETRON 2 MG/ML
4 INJECTION INTRAMUSCULAR; INTRAVENOUS ONCE
Status: COMPLETED | OUTPATIENT
Start: 2021-01-20 | End: 2021-01-20

## 2021-01-20 RX ADMIN — MORPHINE SULFATE 4 MG: 4 INJECTION INTRAVENOUS at 18:30

## 2021-01-20 RX ADMIN — ONDANSETRON 4 MG: 2 INJECTION INTRAMUSCULAR; INTRAVENOUS at 18:21

## 2021-01-20 NOTE — LETTER
"  FORM C-4:  EMPLOYEE’S CLAIM FOR COMPENSATION/ REPORT OF INITIAL TREATMENT  EMPLOYEE’S CLAIM - PROVIDE ALL INFORMATION REQUESTED   First Name Windy Last Name Good Birthdate 1990  Sex female Claim Number   Home Address [address]             [place] [address]                                   Age  30 y.o. 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 [address]]   What were you doing at the time of accident? (if applicable) cleaning under shelving in back room    How did this injury or occupational disease occur? Be specific and answer in detail. Use additional sheet if necessary)  Cleaning the large shelves in back room, reaching underneath went to stand up and hit the back of head on shelf above    If you believe that you have an occupational disease, when did you first have knowledge of the disability and it relationship to your employment?  Witnesses to the Accident  mayda [email or web address]   Nature of Injury or Occupational Disease  Workers' Compensation Part(s) of Body Injured or Affected  Skull, Soft Tissue - Neck, N/A    I CERTIFY THAT THE ABOVE IS TRUE AND CORRECT TO THE BEST OF MY KNOWLEDGE AND THAT I HAVE PROVIDED THIS INFORMATION IN ORDER TO OBTAIN THE BENEFITS OF " [place]).  I HEREBY AUTHORIZE ANY PHYSICIAN, CHIROPRACTOR, SURGEON, PRACTITIONER, OR OTHER PERSON, ANY HOSPITAL, INCLUDING [facility] OR [facility], ANY MEDICAL SERVICE ORGANIZATION, ANY INSURANCE COMPANY, OR OTHER INSTITUTION OR ORGANIZATION TO RELEASE TO EACH OTHER, ANY MEDICAL OR OTHER INFORMATION, INCLUDING BENEFITS PAID OR PAYABLE, PERTINENT TO THIS INJURY OR DISEASE, EXCEPT INFORMATION RELATIVE TO DIAGNOSIS, TREATMENT AND/OR COUNSELING FOR AIDS, PSYCHOLOGICAL CONDITIONS, ALCOHOL OR CONTROLLED SUBSTANCES, FOR WHICH I MUST GIVE SPECIFIC AUTHORIZATION.  A PHOTOSTAT OF THIS AUTHORIZATION SHALL BE AS VALID AS THE ORIGINAL.  Date   01/20/2021                                   Place   [facility]                                      Employee’s Signature   THIS REPORT MUST BE COMPLETED AND MAILED WITHIN 3 WORKING DAYS OF TREATMENT   Place [facility], EMERGENCY DEPT                       Name of Facility [facility]   Date  1/20/2021 Diagnosis  (S16.1XXA) Strain of neck muscle, initial encounter  (S09.90XA) Closed head injury, initial encounter Is there evidence the injured employee was under the influence of alcohol and/or another controlled substance at the time of accident?   Hour  6:49 PM Description of Injury or Disease  Strain of neck muscle, initial encounter  Closed head injury, initial encounter No   Treatment     Have you advised the patient to remain off work five days or more?         No   X-Ray Findings  Negative If Yes   From Date    To Date      From information given by the employee, together with medical evidence, can you directly connect this injury or occupational disease as job incurred? Yes If No, is employee capable of: Full Duty  No Modified Duty  No   Is additional medical care by a physician indicated? Yes If Modified Duty,  "Specify any Limitations / Restrictions       Do you know of any previous injury or disease contributing to this condition or occupational disease? No    Date 1/20/2021 Print Doctor’s Name Clifton Oquendo I certify the employer’s copy of this form was mailed on:   Address [address] [place] [address]  SHYANNE HOOPER [id number] [phone] INSURER’S USE ONLY   Provider’s Tax ID Number   Telephone Dept: [phone]    Doctor’s Signature e-CLIFTON Montiel M.D. Degree  MD       Form C-4 (rev.10/07)                                                                         BRIEF DESCRIPTION OF RIGHTS AND BENEFITS  (Pursuant to [place] 616[place])    Notice of Injury or Occupational Disease (Incident Report Form C-1): If an injury or occupational disease (OD) arises out of and in the course of employment, you must provide written notice to your employer as soon as practicable, but no later than 7 days after the accident or OD. Your employer shall maintain a sufficient supply of the required forms.    Claim for Compensation (Form C-4): If medical treatment is sought, the form C-4 is available at the place of initial treatment. A completed \"Claim for Compensation\" (Form C-4) must be filed within 90 days after an accident or OD. The treating physician or chiropractor must, within 3 working days after treatment, complete and mail to the employer, the employer's insurer and third-party , the Claim for Compensation.    Medical Treatment: If you require medical treatment for your on-the-job injury or OD, you may be required to select a physician or chiropractor from a list provided by your workers’ compensation insurer, if it has contracted with an Organization for Managed Care (MCO) or Preferred Provider Organization (PPO) or providers of health care. If your employer has not entered into a contract with an MCO or PPO, you may select a physician or chiropractor from the Panel of Physicians and Chiropractors. Any medical costs related to " your industrial injury or OD will be paid by your insurer.    Temporary Total Disability (TTD): If your doctor has certified that you are unable to work for a period of at least 5 consecutive days, or 5 cumulative days in a 20-day period, or places restrictions on you that your employer does not accommodate, you may be entitled to TTD compensation.    Temporary Partial Disability (TPD): If the wage you receive upon reemployment is less than the compensation for TTD to which you are entitled, the insurer may be required to pay you TPD compensation to make up the difference. TPD can only be paid for a maximum of 24 months.    Permanent Partial Disability (PPD): When your medical condition is stable and there is an indication of a PPD as a result of your injury or OD, within 30 days, your insurer must arrange for an evaluation by a rating physician or chiropractor to determine the degree of your PPD. The amount of your PPD award depends on the date of injury, the results of the PPD evaluation, your age and wage.    Permanent Total Disability (PTD): If you are medically certified by a treating physician or chiropractor as permanently and totally disabled and have been granted a PTD status by your insurer, you are entitled to receive monthly benefits not to exceed 66 2/3% of your average monthly wage. The amount of your PTD payments is subject to reduction if you previously received a lump-sum PPD award.    Vocational Rehabilitation Services: You may be eligible for vocational rehabilitation services if you are unable to return to the job due to a permanent physical impairment or permanent restrictions as a result of your injury or occupational disease.    Transportation and Per Tiffanie Reimbursement: You may be eligible for travel expenses and per tiffanie associated with medical treatment.    Reopening: You may be able to reopen your claim if your condition worsens after claim closure.     Appeal Process: If you disagree with  a written determination issued by the insurer or the insurer does not respond to your request, you may appeal to the Department of Administration, , by following the instructions contained in your determination letter. You must appeal the determination within 70 days from the date of the determination letter at [address], [address], [place], [place] [address], or [address], [address], [place], [place] [address]. If you disagree with the  decision, you may appeal to the Department of Administration, . You must file your appeal within 30 days from the date of the  decision letter at [address], [address], [place], [place] [address], or [address], [address], [place], [place] [address]. If you disagree with a decision of an , you may file a petition for judicial review with the District Court. You must do so within 30 days of the Appeal Officer’s decision. You may be represented by an  at your own expense or you may contact the [facility] for possible representation.    [place]  for Injured Workers (NAIW): If you disagree with a  decision, you may request that NAIW represent you without charge at an  Hearing. For information regarding denial of benefits, you may contact the [facility] at: [address], [address], [place] [address], [phone], or [address], [address], [place] [address], [phone]    To File a Complaint with the Division: If you wish to file a complaint with the  of the Division of Industrial Relations (DIR),  please contact the Workers’ Compensation Section, [address], [address], [place], [place] [address], telephone [phone], or [address], [address], [place], [place] [address], telephone [phone].    For assistance with Workers’ Compensation Issues: You may contact the [facility]  [place] Office for Consumer Health Assistance, [address] [place] [address], [address], [place] [address], Toll Free 1-696.448.4733, Web site: http://[facility].[place].gov/Programs/PATRICIA E-mail: patricia@[facility].[place].gov  D-2 (rev. 10/20)              __________________________________________________________________                                    _____01/20/2021____________            Employee Name / Signature                                                                                                                            Date

## 2021-01-21 NOTE — ED TRIAGE NOTES
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 Denies loc, pt said that she feels tired.   Placed c-collar.

## 2021-01-21 NOTE — ED NOTES
Pt taken to ER lobby via wc. Pt and pt spouse aware of dc instructions and had no further questions.Rigid  C-collar removed by patient request just prior to dc.

## 2021-01-21 NOTE — ED PROVIDER NOTES
ED Provider Note    Scribed for Dr. Clifton Oquendo M.D. by Jill Mackey. 1/20/2021  5:47 PM    Primary care provider: Pcp Pt States None  Means of arrival: walk in  History obtained from: patient  History limited by: none noted    CHIEF COMPLAINT  Chief Complaint   Patient presents with   • Closed Head Injury     hit back of head with piece of metal   • Neck Pain       HPI  Windy Funk is a 30 y.o. female who presents to the Emergency Department with a closed head injury onset 4 hours ago. 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 Patient denies experiencing any loss of consciousness or episodes of vomiting before or after the event. The patient states she was prompted to visit the [facility] ED for further evaluation and arrived in a c-collar. The patient endorses associated symptoms of neck pain, slowed speech, and nausea, but denies posterior head pain, fever, chills, cough, or shortness of breath. She also denies coming into contact with anyone who has tested positive for Covid-19 or taking any recent travels outside of the country. The patient is a light tobacco smoker but denies abusing alcohol or drugs. The patient is allergic to Percocet and does not have a PCP. The patient does not take any daily medications.      REVIEW OF SYSTEMS  Pertinent positives include closed head injury, neck pain, slowed speech, and nausea. Pertinent negatives include no posterior head pain, fever, chills, cough, or shortness of breath. As above,    See HPI for further details.     PAST MEDICAL HISTORY   none noted    SURGICAL HISTORY  patient denies any surgical history    SOCIAL HISTORY  Social History     Tobacco Use   • Smoking status: Light Tobacco Smoker   • Smokeless tobacco: Never Used   Substance Use Topics   • Alcohol use: Yes     Comment: occ   • Drug use: No      Social History     Substance and Sexual Activity   Drug Use No  "      FAMILY HISTORY  No family history noted    CURRENT MEDICATIONS  Home Medications     Reviewed by Talya Bolaños R.N. (Registered Nurse) on 01/20/21 at 1636  Med List Status: Complete   Medication Last Dose Status        Patient Tato Taking any Medications                       ALLERGIES  Allergies   Allergen Reactions   • Percocet [Oxycodone-Acetaminophen] Hives       PHYSICAL EXAM  VITAL SIGNS: /77   Pulse 83   Temp 36.8 °C (98.3 °F) (Temporal)   Resp 18   Ht 1.549 m (5' 1\")   Wt 63 kg (139 lb)   SpO2 98%   BMI 26.26 kg/m²     Constitutional: Answers questions slowly, Well developed, Well nourished, mild distress, Non-toxic appearance.   HENT: Normocephalic, No posterior head tenderness, Bilateral external ears normal, Oropharynx moist, No oral exudates.   Eyes: PERRLA, EOMI, Conjunctiva normal, No discharge.   Neck: Posterior neck tenderness, Supple, No stridor.   Lymphatic: No lymphadenopathy noted.   Cardiovascular: Normal heart rate, Normal rhythm.   Thorax & Lungs: Clear to auscultation bilaterally, No respiratory distress, No wheezing, No crackles.   Abdomen: Soft, No tenderness, No masses, No pulsatile masses.   Skin: Warm, Dry, No erythema, No rash.   Extremities:, No edema No cyanosis.   Musculoskeletal: No tenderness to palpation or major deformities noted.  Intact distal pulses  Neurologic: Awake, alert. Moves all extremities spontaneously.  Psychiatric: Affect flat, Judgment normal, Mood normal.     RADIOLOGY  CT-HEAD W/O   Final Result      1.  No CT evidence of acute infarct, hemorrhage or mass.   2.  Small white matter hypodensity in the posterior right frontal lobe may represent a benign perivascular space, sequela of demyelination or gliosis.      CT-CSPINE WITHOUT PLUS RECONS   Final Result         1. No acute fracture from C1 through T1 is visualized.           The radiologist's interpretation of all radiological studies have been reviewed by me.    COURSE & MEDICAL DECISION " MAKING  Pertinent Labs & Imaging studies reviewed. (See chart for details)    5:47 PM - Patient seen and examined at bedside. Discussed plan of care with patient. I informed them that imaging will be ordered to evaluate symptoms. The patient is understanding and agreeable with plan of care. Patient will be treated with 4 mg Morphine and 4 mg Zofran. Ordered CT-Cspine w/out Plus Recons and CT-Head w/o to evaluate her symptoms. The differential diagnoses include but are not limited to: Closed head injury, intracranial hemorrhage         PPE Note: I verified that the patient was wearing a mask and I was wearing appropriate PPE every time I entered the room. The patient's mask was on the patient at all times during my encounter except for a brief view of the oropharynx.      Decision Makin-year-old female with a head injury with subsequent headache feeling drowsy some visual changes.  CT of the head and neck are negative the patient be released with instructions related to head injury and neck strain    FINAL IMPRESSION  1. Strain of neck muscle, initial encounter    2. Closed head injury, initial encounter          Jill MAR (Liat), am scribing for, and in the presence of, Clifton Oquendo M.D..    Electronically signed by: Jill Mackey (Liat), 2021    IClifton M.D. personally performed the services described in this documentation, as scribed by Jill Mackey in my presence, and it is both accurate and complete. E    The note accurately reflects work and decisions made by me.  Clifton Oquendo M.D.  2021  6:53 PM

## 2021-01-22 ENCOUNTER — OCCUPATIONAL MEDICINE (OUTPATIENT)
Dept: OCCUPATIONAL MEDICINE | Facility: CLINIC | Age: 31
End: 2021-01-22
Payer: COMMERCIAL

## 2021-01-22 VITALS
RESPIRATION RATE: 16 BRPM | DIASTOLIC BLOOD PRESSURE: 72 MMHG | HEIGHT: 61 IN | OXYGEN SATURATION: 99 % | HEART RATE: 74 BPM | BODY MASS INDEX: 26.26 KG/M2 | SYSTOLIC BLOOD PRESSURE: 118 MMHG

## 2021-01-22 DIAGNOSIS — S09.90XD CLOSED HEAD INJURY WITHOUT LOSS OF CONSCIOUSNESS, SUBSEQUENT ENCOUNTER: ICD-10-CM

## 2021-01-22 DIAGNOSIS — S16.1XXD ACUTE STRAIN OF NECK MUSCLE, SUBSEQUENT ENCOUNTER: ICD-10-CM

## 2021-01-22 PROCEDURE — 99214 OFFICE O/P EST MOD 30 MIN: CPT | Performed by: NURSE PRACTITIONER

## 2021-01-22 RX ORDER — ONDANSETRON 4 MG/1
4 TABLET, FILM COATED ORAL EVERY 4 HOURS PRN
Qty: 20 TAB | Refills: 0 | Status: SHIPPED | OUTPATIENT
Start: 2021-01-22 | End: 2021-01-27

## 2021-01-22 RX ORDER — MECLIZINE HCL 12.5 MG/1
12.5 TABLET ORAL 3 TIMES DAILY PRN
Qty: 30 TAB | Refills: 0 | Status: SHIPPED | OUTPATIENT
Start: 2021-01-22 | End: 2022-08-08

## 2021-01-22 NOTE — LETTER
[address],   [address] [place] [address]  Phone:  [phone] - Fax:  [phone]   Occupational Health [facility] Progress Report and Disability Certification  Date of Service: 1/22/2021   No Show:  No  Date / Time of Next Visit: 1/29/2021 @ 4:15pm   Claim Information   Patient Name: Windy Funk  Claim Number:     Employer:   [employer] Date of Injury: 1/20/2021     Insurer / TPA: Yobany Armendariz  ID / SSN:     Occupation: Team member  Diagnosis: Diagnoses of Acute strain of neck muscle, subsequent encounter and Closed head injury without loss of consciousness, subsequent encounter were pertinent to this visit.    Medical Information   Related to Industrial Injury? 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 Patient denies experiencing any loss of consciousness or episodes of vomiting before or after the event. Today no improvement of symptoms.The patient reports symptoms of neck pain, speech is slightly delayed, sensitivity to light, dizziness, headache and nausea.  Patient denies posterior head pain, fever, chills, cough, or shortness of breath.  She states that she is taking Tylenol 1000 mg every 4 hours with no improvement of her headache.  She has not applied any ice at this time.  She has not been back to work since the incident, ED requested that she be off work until Monday.  Will place light duty restrictions, no safety sensitive work recommend sedentary only.  Discussed red flag symptoms to go to ED.  She verbalized her understanding.   Objective Findings: Head: No obvious deformities or discolorations noted.  Head is symmetrical and normocephalic.  EOMI's, PERRLA. Cranial nerves I through XII intact. A x O x3.  Patient answers questions appropriately, however responses are slightly delayed.    Neck: Grossly exaggerated  TTP to very light touch to the posterior neck. No spinous process step-offs or deformities to the cervical area. Trachea midline, no JVD, no cervical adenopathy or rigidity. Limited ROM, secondary to pain and guarding. Neg edema, bruising, or warmth noted.     CT-HEAD W/O  Final Result     1.  No CT evidence of acute infarct, hemorrhage or mass.  2.  Small white matter hypodensity in the posterior right frontal lobe may represent a benign perivascular space, sequela of demyelination or gliosis.     CT-CSPINE WITHOUT PLUS RECONS  Final Result        1. No acute fracture from C1 through T1 is visualized.          Pre-Existing Condition(s):     Assessment:   Condition Same    Status: Additional Care Required  Permanent Disability:No    Plan: Medication    Diagnostics:      Comments:  Follow-up in 1 week, per patient ED note that she is to be off until Monday  Restricted duty, no safety sensitive job duties   Recommend continue with OTC Tylenol/Ibuprofen as needed   Recommend ice and heat application as needed   Recommend plenty o  f rest, staying hydrated, and limiting exposure to stimulus  Take meclizine as prescribed for dizziness  Take progesterone as prescribed for nausea    Go to ER: extreme sudden fatigue, severe headache, short-term memory loss, slurred speech, pale ski  n, changes in behavior, constant nausea with vomiting, or loss of consciousness,Limit exposure to light, screen time and noises, Get plenty of rest          Disability Information   Status: Released to Restricted Duty    From:  1/22/2021  Through: 1/29/2021 Restrictions are: Temporary   Physical Restrictions   Sitting:    Standing:    Stooping:    Bending:  < or = to 1 hr/day   Squatting:    Walking:    Climbing:    Pushing:      Pulling:    Other:    Reaching Above Shoulder (L):   Reaching Above Shoulder (R):       Reaching Below Shoulder (L):    Reaching Below Shoulder (R):      Not to exceed Weight Limits   Carrying(hrs):   Weight Limit(lb):  < or = to 10 pounds Lifting(hrs):   Weight  Limit(lb): < or = to 10 pounds   Comments: No safety sensitive job duties, sedentary office tasks only until cleared    Repetitive Actions   Hands: i.e. Fine Manipulations from Grasping:     Feet: i.e. Operating Foot Controls:     Driving / Operate Machinery:     Provider Name:   ELVIS Martin Physician Signature:  Physician Name:     Clinic Name / Location: [address],   [address] [place] [address] Clinic Phone Number: Dept: [phone]   Appointment Time: 4:30 Pm Visit Start Time: 4:36 PM   Check-In Time:  4:33 Pm Visit Discharge Time:  5pm   Original-Treating Physician or Chiropractor    Page 2-Insurer/TPA    Page 3-Employer    Page 4-Employee

## 2021-01-23 NOTE — PROGRESS NOTES
"Subjective:     Windy Funk is a 30 y.o. 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 Patient denies experiencing any loss of consciousness or episodes of vomiting before or after the event. Today no improvement of symptoms.The patient reports symptoms of neck pain, speech is slightly delayed, sensitivity to light, dizziness, headache and nausea.  Patient denies posterior head pain, fever, chills, cough, or shortness of breath.  She states that she is taking Tylenol 1000 mg every 4 hours with no improvement of her headache.  She has not applied any ice at this time.  She has not been back to work since the incident, ED requested that she be off work until Monday.  Will place light duty restrictions, no safety sensitive work recommend sedentary only.  Discussed red flag symptoms to go to ED.  She verbalized her understanding.    ROS: All systems were reviewed on intake form, form was reviewed and signed. See scanned documents in media. Pertinent positives and negatives included in HPI.    PMH: No pertinent past medical history to this problem  MEDS: Medications were reviewed in Epic  ALLERGIES:   Allergies   Allergen Reactions   • Percocet [Oxycodone-Acetaminophen] Hives     SOCHX: Works as Team Member  at [employer]   FH: No pertinent family history to this problem       Objective:     /72   Pulse 74   Resp 16   Ht 1.549 m (5' 1\")   SpO2 99%   BMI 26.26 kg/m²     [unfilled]    Head: No obvious deformities or discolorations noted.  Head is symmetrical and normocephalic.  EOMI's, PERRLA. Cranial nerves I through XII intact. A x O x3.  Patient answers questions appropriately, however responses are slightly delayed.    Neck: Grossly exaggerated TTP to very light touch to the posterior neck. No spinous process step-offs or deformities " to the cervical area. Trachea midline, no JVD, no cervical adenopathy or rigidity. Limited ROM, secondary to pain and guarding. Neg edema, bruising, or warmth noted.     CT-HEAD W/O  Final Result     1.  No CT evidence of acute infarct, hemorrhage or mass.  2.  Small white matter hypodensity in the posterior right frontal lobe may represent a benign perivascular space, sequela of demyelination or gliosis.     CT-CSPINE WITHOUT PLUS RECONS  Final Result        1. No acute fracture from C1 through T1 is visualized.           Assessment/Plan:       1. Acute strain of neck muscle, subsequent encounter  - ondansetron (ZOFRAN) 4 MG Tab tablet; Take 1 Tab by mouth every four hours as needed for Nausea/Vomiting for up to 5 days.  Dispense: 20 Tab; Refill: 0  - meclizine (ANTIVERT) 12.5 MG Tab; Take 1 Tab by mouth 3 times a day as needed.  Dispense: 30 Tab; Refill: 0    2. Closed head injury without loss of consciousness, subsequent encounter  - ondansetron (ZOFRAN) 4 MG Tab tablet; Take 1 Tab by mouth every four hours as needed for Nausea/Vomiting for up to 5 days.  Dispense: 20 Tab; Refill: 0  - meclizine (ANTIVERT) 12.5 MG Tab; Take 1 Tab by mouth 3 times a day as needed.  Dispense: 30 Tab; Refill: 0    Released to Restricted Duty FROM 1/22/2021 TO 1/29/2021  No safety sensitive job duties, sedentary office tasks only until cleared  Follow-up in 1 week, per patient ED note that she is to be off until Monday  Restricted duty, no safety sensitive job duties   Recommend continue with OTC Tylenol/Ibuprofen as needed   Recommend ice and heat application as needed   Recommend plenty o  f rest, staying hydrated, and limiting exposure to stimulus  Take meclizine as prescribed for dizziness  Take progesterone as prescribed for nausea    Go to ER: extreme sudden fatigue, severe headache, short-term memory loss, slurred speech, pale ski  n, changes in behavior, constant nausea with vomiting, or loss of consciousness,Limit exposure  to light, screen time and noises, Get plenty of rest          Differential diagnosis, natural history, supportive care, and indications for immediate follow-up discussed.    Approximately 35 minutes were spent in reviewing notes, preparing for visit, obtaining history, exam and evaluation, patient counseling/education and post visit documentation/orders.

## 2021-01-29 ENCOUNTER — OCCUPATIONAL MEDICINE (OUTPATIENT)
Dept: OCCUPATIONAL MEDICINE | Facility: CLINIC | Age: 31
End: 2021-01-29
Payer: COMMERCIAL

## 2021-01-29 VITALS
SYSTOLIC BLOOD PRESSURE: 118 MMHG | BODY MASS INDEX: 28.66 KG/M2 | HEART RATE: 69 BPM | TEMPERATURE: 97.9 F | OXYGEN SATURATION: 98 % | WEIGHT: 151.8 LBS | RESPIRATION RATE: 16 BRPM | DIASTOLIC BLOOD PRESSURE: 76 MMHG | HEIGHT: 61 IN

## 2021-01-29 DIAGNOSIS — S16.1XXD ACUTE STRAIN OF NECK MUSCLE, SUBSEQUENT ENCOUNTER: ICD-10-CM

## 2021-01-29 DIAGNOSIS — S09.90XD CLOSED HEAD INJURY WITHOUT LOSS OF CONSCIOUSNESS, SUBSEQUENT ENCOUNTER: ICD-10-CM

## 2021-01-29 PROCEDURE — 99213 OFFICE O/P EST LOW 20 MIN: CPT | Performed by: NURSE PRACTITIONER

## 2021-01-29 RX ORDER — ONDANSETRON 4 MG/1
4 TABLET, FILM COATED ORAL EVERY 4 HOURS PRN
Qty: 20 TAB | Refills: 0 | Status: SHIPPED | OUTPATIENT
Start: 2021-01-29 | End: 2021-02-03

## 2021-01-29 RX ORDER — TIZANIDINE 4 MG/1
4 TABLET ORAL EVERY 6 HOURS PRN
Qty: 30 TAB | Refills: 0 | Status: SHIPPED | OUTPATIENT
Start: 2021-01-29 | End: 2021-02-24 | Stop reason: SDUPTHER

## 2021-01-29 ASSESSMENT — ENCOUNTER SYMPTOMS
CONSTITUTIONAL NEGATIVE: 1
HEADACHES: 1
PHOTOPHOBIA: 1
PSYCHIATRIC NEGATIVE: 1
CARDIOVASCULAR NEGATIVE: 1
DIZZINESS: 1
NAUSEA: 1
SENSORY CHANGE: 0
VOMITING: 0
MYALGIAS: 1
FOCAL WEAKNESS: 0
RESPIRATORY NEGATIVE: 1
LOSS OF CONSCIOUSNESS: 0
NECK PAIN: 1

## 2021-01-29 NOTE — LETTER
[address],   [address] [place] [address]  Phone:  [phone] - Fax:  [phone]   Occupational Health [facility] Progress Report and Disability Certification  Date of Service: 1/29/2021   No Show:  No  Date / Time of Next Visit: 2/10/2021 @ 4:15 PM   Claim Information   Patient Name: Windy Funk  Claim Number:     Employer:   [employer] Date of Injury:      Insurer / TPA: Yobany Armendariz  ID / SSN:     Occupation:  TEAM MEMBER Diagnosis: Diagnoses of Closed head injury without loss of consciousness, subsequent encounter and Acute strain of neck muscle, subsequent encounter were pertinent to this visit.    Medical Information   Related to Industrial Injury? 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 Patient denies experiencing any loss of consciousness or episodes of vomiting before or after the event. Today mild improvement of symptoms. The patient reports symptoms of neck pain, sensitivity to light and sounds, dizziness, and headache.  She notes that she has some ringing in both of her ears, making it difficult for her to tolerate very loud noises.  Patient denies posterior head pain, fever, chills, cough, or shortness of breath.  She states that she is taking Tylenol and Aleve with mild improvement of her headache.  She is applying ice and heat as needed with some improvement.  She states that the meclizine really helped with the dizziness.  She was unable to try the Zofran as it has not been covered by Workmen's Comp.  Commend that she follow-up with Workmen's Comp for approval of this medication.  He has not been back to work since the incident as there is no light duty at this time.  Continue light duty restrictions, no safety sensitive work recommend sedentary only.  Discussed red flag symptoms to go to ED.  She verbalized her  understanding.    Objective Findings: Head: No obvious deformities or discolorations noted.  Head is symmetrical and normocephalic.  EOMI's, PERRLA. Cranial nerves I through XII intact. A x O x3.  Patient answers questions appropriately, significant improvement since last visit.  Ear canals are clear, tympanic membrane is pearly gray, no cerumen impaction noted.     Neck: Very mild TTP to very light touch to the posterior neck. No spinous process step-offs or deformities to the cervical area. Trachea midline, no JVD, no cervical adenopathy or rigidity. Limited ROM, secondary to pain and guarding. Neg edema, bruising, or warmth noted.      CT-HEAD W/O  Final Result     1.  No CT evidence of acute infarct, hemorrhage or mass.  2.  Small white matter hypodensity in the posterior right frontal lobe may represent a benign perivascular space, sequela of demyelination or gliosis.     CT-CSPINE WITHOUT PLUS RECONS  Final Result        1. No acute fracture from C1 through T1 is visualized.      Pre-Existing Condition(s):     Assessment:   Condition Improved    Status: Additional Care Required  Permanent Disability:No    Plan: MedicationMedication (NOT at Work)    Diagnostics:      Comments:  Follow-up in 2 weeks  Restricted duty, no safety sensitive job duties   Recommend continue with OTC Tylenol/Ibuprofen as needed   Recommend ice and heat application as needed   Take tizanidine as prescribed DO NOT DRIVE or WORK while taking this medi  cation  Recommend plenty of rest, staying hydrated, and limiting exposure to stimulus  Take meclizine as prescribed for dizziness  Take Zofran as prescribed for nausea    Go to ER: extreme sudden fatigue, severe headache, short-term memory loss, slur  red speech, pale skin, changes in behavior, constant nausea with vomiting, or loss of consciousness, limit exposure to light, screen time, and noises, Get plenty of rest    Disability Information   Status: Released to Restricted Duty    From:   1/29/2021  Through: 2/10/2021 Restrictions are: Temporary   Physical Restrictions   Sitting:    Standing:    Stooping:    Bending:  < or = to 1 hr/day   Squatting:    Walking:    Climbing:    Pushing:      Pulling:    Other:    Reaching Above Shoulder (L):   Reaching Above Shoulder (R):       Reaching Below Shoulder (L):    Reaching Below Shoulder (R):      Not to exceed Weight Limits   Carrying(hrs):   Weight Limit(lb): < or = to 10 pounds Lifting(hrs):   Weight  Limit(lb): < or = to 10 pounds   Comments: No safety sensitive job duties, recommend sedentary office tasks only until cleared    Repetitive Actions   Hands: i.e. Fine Manipulations from Grasping:     Feet: i.e. Operating Foot Controls:     Driving / Operate Machinery:     Provider Name:   ELVIS Martin Physician Signature:  Physician Name:     Clinic Name / Location: [address] [address] Clinic Phone Number: Dept: [phone]   Appointment Time: 4:15 Pm Visit Start Time: 4:16 PM   Check-In Time:  4:13 Pm Visit Discharge Time:  4:40 PM   Original-Treating Physician or Chiropractor    Page 2-Insurer/TPA    Page 3-Employer    Page 4-Employee

## 2021-01-30 NOTE — PROGRESS NOTES
Subjective:     Windy Funk is a 30 y.o. 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 Patient denies experiencing any loss of consciousness or episodes of vomiting before or after the event. Today mild improvement of symptoms. The patient reports symptoms of neck pain, sensitivity to light and sounds, dizziness, and headache.  She notes that she has some ringing in both of her ears, making it difficult for her to tolerate very loud noises.  Patient denies posterior head pain, fever, chills, cough, or shortness of breath.  She states that she is taking Tylenol and Aleve with mild improvement of her headache.  She is applying ice and heat as needed with some improvement.  She states that the meclizine really helped with the dizziness.  She was unable to try the Zofran as it has not been covered by Workmen's Comp.  Commend that she follow-up with [employer] for approval of this medication.  He has not been back to work since the incident as there is no light duty at this time.  Continue light duty restrictions, no safety sensitive work recommend sedentary only.  Discussed red flag symptoms to go to ED.  She verbalized her understanding.     Review of Systems   Constitutional: Negative.    HENT: Positive for tinnitus.    Eyes: Positive for photophobia.   Respiratory: Negative.    Cardiovascular: Negative.    Gastrointestinal: Positive for nausea. Negative for vomiting.   Musculoskeletal: Positive for myalgias and neck pain.   Skin: Negative.    Neurological: Positive for dizziness and headaches. Negative for sensory change, focal weakness and loss of consciousness.   Psychiatric/Behavioral: Negative.        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 Appears well-developed and well-nourished.   Cardiovascular: Normal rate.    Pulmonary/Chest: Effort normal. No respiratory distress.   Neurological: Patient is alert and oriented to person, place, and time.   Skin: Skin is warm and dry.   Psychiatric: Normal mood and affect. Behavior is normal.     Head: No obvious deformities or discolorations noted.  Head is symmetrical and normocephalic.  EOMI's, PERRLA. Cranial nerves I through XII intact. A x O x3.  Patient answers questions appropriately, significant improvement since last visit.  Ear canals are clear, tympanic membrane is pearly gray, no cerumen impaction noted.     Neck: Very mild TTP to very light touch to the posterior neck. No spinous process step-offs or deformities to the cervical area. Trachea midline, no JVD, no cervical adenopathy or rigidity. Limited ROM, secondary to pain and guarding. Neg edema, bruising, or warmth noted.      CT-HEAD W/O  Final Result     1.  No CT evidence of acute infarct, hemorrhage or mass.  2.  Small white matter hypodensity in the posterior right frontal lobe may represent a benign perivascular space, sequela of demyelination or gliosis.     CT-CSPINE WITHOUT PLUS RECONS  Final Result        1. No acute fracture from C1 through T1 is visualized.       Assessment/Plan:       1. Closed head injury without loss of consciousness, subsequent encounter  - ondansetron (ZOFRAN) 4 MG Tab tablet; Take 1 Tab by mouth every four hours as needed for Nausea/Vomiting for up to 5 days.  Dispense: 20 Tab; Refill: 0    2. Acute strain of neck muscle, subsequent encounter  - tizanidine (ZANAFLEX) 4 MG Tab; Take 1 Tab by mouth every 6 hours as needed.  Dispense: 30 Tab; Refill: 0  - ondansetron (ZOFRAN) 4 MG Tab tablet; Take 1 Tab by mouth every four hours as needed " for Nausea/Vomiting for up to 5 days.  Dispense: 20 Tab; Refill: 0    Released to Restricted Duty FROM 1/29/2021 TO 2/10/2021  No safety sensitive job duties, recommend sedentary office tasks only until cleared    Follow-up in 2 weeks  Restricted duty, no safety sensitive job duties   Recommend continue with OTC Tylenol/Ibuprofen as needed   Recommend ice and heat application as needed   Take tizanidine as prescribed DO NOT DRIVE or WORK while taking this medi  cation  Recommend plenty of rest, staying hydrated, and limiting exposure to stimulus  Take meclizine as prescribed for dizziness  Take Zofran as prescribed for nausea    Go to ER: extreme sudden fatigue, severe headache, short-term memory loss, slur  red speech, pale skin, changes in behavior, constant nausea with vomiting, or loss of consciousness, limit exposure to light, screen time, and noises, Get plenty of rest    Differential diagnosis, natural history, supportive care, and indications for immediate follow-up discussed.    Approximately 25 minutes was spent in preparing for visit, obtaining history, exam and evaluation, patient counseling/education and post visit documentation/orders.

## 2021-02-10 ENCOUNTER — OCCUPATIONAL MEDICINE (OUTPATIENT)
Dept: OCCUPATIONAL MEDICINE | Facility: CLINIC | Age: 31
End: 2021-02-10
Payer: COMMERCIAL

## 2021-02-10 VITALS
SYSTOLIC BLOOD PRESSURE: 120 MMHG | OXYGEN SATURATION: 99 % | DIASTOLIC BLOOD PRESSURE: 80 MMHG | WEIGHT: 152 LBS | TEMPERATURE: 98 F | HEIGHT: 61 IN | BODY MASS INDEX: 28.7 KG/M2 | HEART RATE: 80 BPM

## 2021-02-10 DIAGNOSIS — S09.90XD CLOSED HEAD INJURY WITHOUT LOSS OF CONSCIOUSNESS, SUBSEQUENT ENCOUNTER: ICD-10-CM

## 2021-02-10 DIAGNOSIS — S16.1XXD ACUTE STRAIN OF NECK MUSCLE, SUBSEQUENT ENCOUNTER: ICD-10-CM

## 2021-02-10 PROCEDURE — 99213 OFFICE O/P EST LOW 20 MIN: CPT | Performed by: NURSE PRACTITIONER

## 2021-02-10 ASSESSMENT — ENCOUNTER SYMPTOMS
DOUBLE VISION: 0
BLURRED VISION: 0
WEAKNESS: 0
CONSTITUTIONAL NEGATIVE: 1
VOMITING: 0
RESPIRATORY NEGATIVE: 1
NAUSEA: 1
PSYCHIATRIC NEGATIVE: 1
HEADACHES: 1
DIZZINESS: 0
PHOTOPHOBIA: 1
MYALGIAS: 1
NECK PAIN: 1
CARDIOVASCULAR NEGATIVE: 1

## 2021-02-10 ASSESSMENT — PAIN SCALES - GENERAL: PAINLEVEL: 7=MODERATE-SEVERE PAIN
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
## 2021-02-24 ENCOUNTER — OCCUPATIONAL MEDICINE (OUTPATIENT)
Dept: OCCUPATIONAL MEDICINE | Facility: CLINIC | Age: 31
End: 2021-02-24
Payer: COMMERCIAL

## 2021-02-24 VITALS
HEART RATE: 70 BPM | DIASTOLIC BLOOD PRESSURE: 76 MMHG | WEIGHT: 152 LBS | OXYGEN SATURATION: 100 % | BODY MASS INDEX: 28.7 KG/M2 | HEIGHT: 61 IN | RESPIRATION RATE: 18 BRPM | SYSTOLIC BLOOD PRESSURE: 118 MMHG

## 2021-02-24 DIAGNOSIS — S16.1XXD ACUTE STRAIN OF NECK MUSCLE, SUBSEQUENT ENCOUNTER: ICD-10-CM

## 2021-02-24 DIAGNOSIS — S09.90XD CLOSED HEAD INJURY WITHOUT LOSS OF CONSCIOUSNESS, SUBSEQUENT ENCOUNTER: ICD-10-CM

## 2021-02-24 PROCEDURE — 99213 OFFICE O/P EST LOW 20 MIN: CPT | Performed by: NURSE PRACTITIONER

## 2021-02-24 RX ORDER — TIZANIDINE 4 MG/1
4 TABLET ORAL EVERY 6 HOURS PRN
Qty: 30 TABLET | Refills: 0 | Status: SHIPPED | OUTPATIENT
Start: 2021-02-24 | End: 2021-03-10

## 2021-02-24 ASSESSMENT — ENCOUNTER SYMPTOMS
FOCAL WEAKNESS: 0
MYALGIAS: 1
DIZZINESS: 0
EYES NEGATIVE: 1
PHOTOPHOBIA: 0
BLURRED VISION: 0
HEADACHES: 1
NAUSEA: 0
PSYCHIATRIC NEGATIVE: 1
CARDIOVASCULAR NEGATIVE: 1
VOMITING: 0
NECK PAIN: 1
CONSTITUTIONAL NEGATIVE: 1
DOUBLE VISION: 0
RESPIRATORY NEGATIVE: 1

## 2021-02-24 NOTE — LETTER
[address],   [address] [place] [address]  Phone:  [phone] - Fax:  [phone]   Occupational Health [facility] Progress Report and Disability Certification  Date of Service: 2/24/2021   No Show:  No  Date / Time of Next Visit: 3/10/2021 @ 4:30pm   Claim Information   Patient Name: Windy Funk  Claim Number:     Employer:   [employer] Date of Injury: 1/20/2021     Insurer / TPA: Yobany Armendariz  ID / SSN:     Occupation: Team member  Diagnosis: Diagnoses of Closed head injury without loss of consciousness, subsequent encounter and Acute strain of neck muscle, subsequent encounter were pertinent to this visit.    Medical Information   Related to Industrial Injury? 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 Patient denies experiencing any loss of consciousness or episodes of vomiting before or after the event.  Today notes continued gradual improvement.  She states that she still gets an occasional headache and some soreness in the neck.  She continues to have ringing more prevalent in the right ear and intermittent in the left.  She states that her visual disturbances have resolved, her dizziness is pretty much resolved, no longer has nausea, and has no continued issues with light.  She notes that she does continue to have some issues with loud noises.  States she was finally able to get the muscle relaxer which has helped significantly with her symptoms.  She has began doing planks and stretching exercises to do to get back to normal.  She states that physical therapy is still pending at this time.  She notes that she has not been back to work since the incident.  Plan of care discussed with patient.  Patient has obtained a .   Objective Findings: Head: No obvious deformities or discolorations noted.  Head is symmetrical and  normocephalic.  EOMI's, PERRLA. Cranial nerves I through XII intact. A x O x3.  Patient answers questions appropriately, significant improvement since last visit.  Ear canals are clear, tympanic membrane is pearly gray, no cerumen impaction noted.     Neck: Negative TTP to the posterior neck. No spinous process step-offs or deformities to the cervical area. Trachea midline, no JVD, no cervical adenopathy or rigidity. FROM, subjective discomfort. Neg edema, bruising, or warmth noted.      CT-HEAD W/O  Final Result     1.  No CT evidence of acute infarct, hemorrhage or mass.  2.  Small white matter hypodensity in the posterior right frontal lobe may represent a benign perivascular space, sequela of demyelination or gliosis.     CT-CSPINE WITHOUT PLUS RECONS  Final Result        1. No acute fracture from C1 through T1 is visualized.   Pre-Existing Condition(s):     Assessment:   Condition Improved    Status: Additional Care Required  Permanent Disability:No    Plan: PT    Diagnostics:      Comments:  Follow-up in 2 weeks  Restricted duty, no safety sensitive job duties   Vestibular therapy referral pending  Recommend continue with OTC Tylenol/Ibuprofen as needed   Recommend ice and heat application as needed   Take tizanidine as prescribed DO NOT   DRIVE or WORK while taking this medication   Recommend plenty of rest, staying hydrated, and limiting exposure to stimulus    Go to ER: extreme sudden fatigue, severe headache, short-term memory loss, slurred speech, pale skin, changes in behavior,   constant nausea with vomiting, or loss of consciousness, limit exposure to light, screen time, and noises, Get plenty of rest       Disability Information   Status: Released to Restricted Duty    From:  2/24/2021  Through: 3/10/2021 Restrictions are: Temporary   Physical Restrictions   Sitting:    Standing:    Stooping:    Bending:  < or = to 1 hr/day   Squatting:    Walking:    Climbing:    Pushing:      Pulling:    Other:     Reaching Above Shoulder (L):   Reaching Above Shoulder (R):       Reaching Below Shoulder (L):    Reaching Below Shoulder (R):      Not to exceed Weight Limits   Carrying(hrs):   Weight Limit(lb): < or = to 10 pounds Lifting(hrs):   Weight  Limit(lb): < or = to 10 pounds   Comments: No safety sensitive job duties, recommend sedentary office tasks only until cleared    Repetitive Actions   Hands: i.e. Fine Manipulations from Grasping:     Feet: i.e. Operating Foot Controls:     Driving / Operate Machinery:     Provider Name:   ELVIS Martin Physician Signature:  Physician Name:     Clinic Name / Location: [address],   [address] [place] [address], [place] [address] Clinic Phone Number: Dept: [phone]   Appointment Time: 4:15 Pm Visit Start Time: 4:23 PM   Check-In Time:  4:15 Pm Visit Discharge Time:  4:50pm   Original-Treating Physician or Chiropractor    Page 2-Insurer/TPA    Page 3-Employer    Page 4-Employee

## 2021-02-25 NOTE — PROGRESS NOTES
"Subjective:     Windy Funk is a 30 y.o. 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 Patient denies experiencing any loss of consciousness or episodes of vomiting before or after the event.  Today notes continued gradual improvement.  She states that she still gets an occasional headache and some soreness in the neck.  She continues to have ringing more prevalent in the right ear and intermittent in the left.  She states that her visual disturbances have resolved, her dizziness is pretty much resolved, no longer has nausea, and has no continued issues with light.  She notes that she does continue to have some issues with loud noises.  States she was finally able to get the muscle relaxer which has helped significantly with her symptoms.  She has began doing planks and stretching exercises to do to get back to normal.  She states that physical therapy is still pending at this time.  She notes that she has not been back to work since the incident.  Plan of care discussed with patient.  Patient has obtained a .    Review of Systems   Constitutional: Negative.    HENT: Positive for tinnitus. Negative for ear discharge, ear pain and hearing loss.    Eyes: Negative.  Negative for blurred vision, double vision and photophobia.   Respiratory: Negative.    Cardiovascular: Negative.    Gastrointestinal: Negative for nausea and vomiting.   Musculoskeletal: Positive for myalgias and neck pain.   Neurological: Positive for headaches. Negative for dizziness and focal weakness.   Psychiatric/Behavioral: Negative.        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 Appears well-developed and well-nourished.   Cardiovascular: Normal rate.    Pulmonary/Chest: Effort normal. No respiratory distress.   Neurological: Patient is alert and oriented to person, place, and time.   Skin: Skin is warm and dry.   Psychiatric: Normal mood and affect. Behavior is normal.     Head: No obvious deformities or discolorations noted.  Head is symmetrical and normocephalic.  EOMI's, PERRLA. Cranial nerves I through XII intact. A x O x3.  Patient answers questions appropriately, significant improvement since last visit.  Ear canals are clear, tympanic membrane is pearly gray, no cerumen impaction noted.     Neck: Negative TTP to the posterior neck. No spinous process step-offs or deformities to the cervical area. Trachea midline, no JVD, no cervical adenopathy or rigidity. FROM, subjective discomfort. Neg edema, bruising, or warmth noted.      CT-HEAD W/O  Final Result     1.  No CT evidence of acute infarct, hemorrhage or mass.  2.  Small white matter hypodensity in the posterior right frontal lobe may represent a benign perivascular space, sequela of demyelination or gliosis.     CT-CSPINE WITHOUT PLUS RECONS  Final Result        1. No acute fracture from C1 through T1 is visualized.    Assessment/Plan:       1. Closed head injury without loss of consciousness, subsequent encounter    2. Acute strain of neck muscle, subsequent encounter  - tizanidine (ZANAFLEX) 4 MG Tab; Take 1 tablet by mouth every 6 hours as needed.  Dispense: 30 tablet; Refill: 0    Released to Restricted Duty FROM 2/24/2021 TO 3/10/2021  No safety sensitive job duties, recommend sedentary office tasks only until cleared  Follow-up in 2 weeks  Restricted duty, no safety sensitive job duties   Vestibular therapy referral pending  Recommend continue with OTC Tylenol/Ibuprofen as needed   Recommend ice and heat application as needed   Take tizanidine as prescribed DO NOT   DRIVE or WORK while  taking this medication   Recommend plenty of rest, staying hydrated, and limiting exposure to stimulus    Go to ER: extreme sudden fatigue, severe headache, short-term memory loss, slurred speech, pale skin, changes in behavior,   constant nausea with vomiting, or loss of consciousness, limit exposure to light, screen time, and noises, Get plenty of rest       Differential diagnosis, natural history, supportive care, and indications for immediate follow-up discussed.    Approximately 25 minutes was spent in preparing for visit, obtaining history, exam and evaluation, patient counseling/education and post visit documentation/orders.

## 2021-03-10 ENCOUNTER — OCCUPATIONAL MEDICINE (OUTPATIENT)
Dept: OCCUPATIONAL MEDICINE | Facility: CLINIC | Age: 31
End: 2021-03-10
Payer: COMMERCIAL

## 2021-03-10 VITALS
OXYGEN SATURATION: 99 % | HEIGHT: 61 IN | TEMPERATURE: 97.5 F | WEIGHT: 157 LBS | DIASTOLIC BLOOD PRESSURE: 80 MMHG | BODY MASS INDEX: 29.64 KG/M2 | HEART RATE: 78 BPM | SYSTOLIC BLOOD PRESSURE: 120 MMHG

## 2021-03-10 DIAGNOSIS — S09.90XD CLOSED HEAD INJURY WITHOUT LOSS OF CONSCIOUSNESS, SUBSEQUENT ENCOUNTER: ICD-10-CM

## 2021-03-10 DIAGNOSIS — S16.1XXD ACUTE STRAIN OF NECK MUSCLE, SUBSEQUENT ENCOUNTER: ICD-10-CM

## 2021-03-10 PROCEDURE — 99213 OFFICE O/P EST LOW 20 MIN: CPT | Performed by: NURSE PRACTITIONER

## 2021-03-10 RX ORDER — ONDANSETRON 4 MG/1
TABLET, FILM COATED ORAL
COMMUNITY
Start: 2021-02-09 | End: 2022-08-08

## 2021-03-10 RX ORDER — CYCLOBENZAPRINE HCL 5 MG
10 TABLET ORAL 3 TIMES DAILY PRN
Qty: 21 TABLET | Refills: 0 | Status: SHIPPED | OUTPATIENT
Start: 2021-03-10 | End: 2021-03-17

## 2021-03-10 RX ORDER — MELOXICAM 7.5 MG/1
15 TABLET ORAL DAILY
Qty: 14 TABLET | Refills: 0 | Status: SHIPPED | OUTPATIENT
Start: 2021-03-10 | End: 2021-03-17

## 2021-03-10 ASSESSMENT — ENCOUNTER SYMPTOMS
CONSTITUTIONAL NEGATIVE: 1
GASTROINTESTINAL NEGATIVE: 1
NECK PAIN: 1
MYALGIAS: 1
NEUROLOGICAL NEGATIVE: 1
RESPIRATORY NEGATIVE: 1
PSYCHIATRIC NEGATIVE: 1
EYES NEGATIVE: 1

## 2021-03-10 ASSESSMENT — PAIN SCALES - GENERAL: PAINLEVEL: 7=MODERATE-SEVERE PAIN

## 2021-03-11 NOTE — PROGRESS NOTES
"Subjective:     Windy Funk is a 30 y.o. 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 Patient denies experiencing any loss of consciousness or episodes of vomiting before or after the event.  Today states she goes up and down like a roller coaster. She states that she has neck tenderness and is noticing some tingling in her fingers.  She continues to have ringing in the right ear with complete resolution in the left.  She states that her visual disturbances have resolved, her dizziness is pretty much resolved, no longer has nausea, and has no continued issues with light.  She notes that she does continue to have some issues with loud noises, [place]  She is taking the muscle relaxer which has helped in the past, now butch not seem to be working.As a result she has done nothing all week.  She states that vestibular therapy is still pending at this time.  She notes that she has not been back to work since the incident.  Plan of care discussed with patient.  Patient has obtained a .    Review of Systems   Constitutional: Negative.    HENT: Positive for tinnitus.         Ringing in Right Ear only, left ear resolution    Eyes: Negative.    Respiratory: Negative.    Gastrointestinal: Negative.    Musculoskeletal: Positive for myalgias and neck pain.   Skin: Negative.    Neurological: Negative.    Psychiatric/Behavioral: Negative.        SOCHX: Works as Team Member  at [employer]   FH: No pertinent family history to this problem       Objective:     /80 (BP Location: Left arm, Patient Position: Sitting)   Pulse 78   Temp 36.4 °C (97.5 °F) (Temporal)   Ht 1.549 m (5' 1\")   Wt 71.2 kg (157 lb)   SpO2 99%   BMI 29.66 kg/m²     Constitutional: Patient is in no acute distress. Appears well-developed and " well-nourished.   Cardiovascular: Normal rate.    Pulmonary/Chest: Effort normal. No respiratory distress.   Neurological: Patient is alert and oriented to person, place, and time.   Skin: Skin is warm and dry.   Psychiatric: Normal mood and affect. Behavior is normal.     Head: No obvious deformities or discolorations noted.  Head is symmetrical and normocephalic.  EOMI's, PERRLA. Cranial nerves I through XII intact. A x O x3.  Patient answers questions appropriately, significant improvement since last visit.  Ear canals are clear, tympanic membrane is pearly gray, no cerumen impaction noted.     Neck: Very mild TTP to the posterior neck. No spinous process step-offs or deformities to the cervical area. Trachea midline, no JVD, no cervical adenopathy or rigidity. FROM, subjective discomfort. Neg edema, bruising, or warmth noted.      CT-HEAD W/O  Final Result     1.  No CT evidence of acute infarct, hemorrhage or mass.  2.  Small white matter hypodensity in the posterior right frontal lobe may represent a benign perivascular space, sequela of demyelination or gliosis.     CT-CSPINE WITHOUT PLUS RECONS  Final Result        1. No acute fracture from C1 through T1 is visualized.    Assessment/Plan:       1. Closed head injury without loss of consciousness, subsequent encounter  - meloxicam (MOBIC) 7.5 MG Tab; Take 2 Tablets by mouth every day for 7 days.  Dispense: 14 tablet; Refill: 0    2. Acute strain of neck muscle, subsequent encounter  - cyclobenzaprine (FLEXERIL) 5 mg tablet; Take 2 Tablets by mouth 3 times a day as needed for up to 7 days.  Dispense: 21 tablet; Refill: 0  - meloxicam (MOBIC) 7.5 MG Tab; Take 2 Tablets by mouth every day for 7 days.  Dispense: 14 tablet; Refill: 0    Other orders  - ondansetron (ZOFRAN) 4 MG Tab tablet; TAKE 1 TAB BY MOUTH EVERY FOUR HOURS AS NEEDED FOR NAUSEA/VOMITING FOR UP TO 5 DAYS.    Released to Restricted Duty FROM 3/10/2021 TO 3/24/2021  No safety sensitive job duties  until cleared    Follow-up in 2 weeks  Restricted duty, no safety sensitive job duties   Vestibular therapy referral pending  Recommend ice and heat application as needed   Take Meloxicam as prescribed, if ineffective okay to return to OTC Tylenol/Ibuprofen as needed     Take cyclobenzaprine as prescribed DO NOT DRIVE or WORK while taking this medication   Recommend plenty of rest, staying hydrated, and limiting exposure to stimulus     Go to ER: extreme sudden fatigue, severe headache, short-term memory loss, slu  rred speech, pale skin, changes in behavior,   constant nausea with vomiting, or loss of consciousness, limit exposure to light, screen time, and noises, Get plenty of rest    Differential diagnosis, natural history, supportive care, and indications for immediate follow-up discussed.    Approximately 25 minutes was spent in preparing for visit, obtaining history, exam and evaluation, patient counseling/education and post visit documentation/orders.

## 2022-08-08 ENCOUNTER — OFFICE VISIT (OUTPATIENT)
Dept: URGENT CARE | Facility: PHYSICIAN GROUP | Age: 32
End: 2022-08-08
Payer: COMMERCIAL

## 2022-08-08 VITALS
TEMPERATURE: 99 F | OXYGEN SATURATION: 97 % | SYSTOLIC BLOOD PRESSURE: 106 MMHG | WEIGHT: 144 LBS | DIASTOLIC BLOOD PRESSURE: 68 MMHG | BODY MASS INDEX: 26.5 KG/M2 | RESPIRATION RATE: 16 BRPM | HEART RATE: 86 BPM | HEIGHT: 62 IN

## 2022-08-08 DIAGNOSIS — S13.4XXA WHIPLASH INJURY TO NECK, INITIAL ENCOUNTER: ICD-10-CM
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
## 2022-08-08 DIAGNOSIS — S80.01XA CONTUSION OF RIGHT KNEE, INITIAL ENCOUNTER: ICD-10-CM

## 2022-08-08 DIAGNOSIS — S39.012A STRAIN OF LUMBAR REGION, INITIAL ENCOUNTER: ICD-10-CM

## 2022-08-08 PROCEDURE — 99213 OFFICE O/P EST LOW 20 MIN: CPT | Performed by: PHYSICIAN ASSISTANT

## 2022-08-08 RX ORDER — CYCLOBENZAPRINE HCL 5 MG
5-10 TABLET ORAL 3 TIMES DAILY PRN
Qty: 30 TABLET | Refills: 0 | Status: SHIPPED | OUTPATIENT
Start: 2022-08-08 | End: 2023-10-31

## 2022-08-08 RX ORDER — KETOROLAC TROMETHAMINE 30 MG/ML
30 INJECTION, SOLUTION INTRAMUSCULAR; INTRAVENOUS ONCE
Status: COMPLETED | OUTPATIENT
Start: 2022-08-08 | End: 2022-08-08

## 2022-08-08 RX ADMIN — KETOROLAC TROMETHAMINE 30 MG: 30 INJECTION, SOLUTION INTRAMUSCULAR; INTRAVENOUS at 18:03

## 2022-08-08 NOTE — LETTER
August 8, 2022         Patient: Windy Funk   YOB: 1990   Date of Visit: 8/8/2022           To Whom it May Concern:    Windy Funk was seen in my clinic on 8/8/2022. She may return to work on 8/11/2022.    If you have any questions or concerns, please don't hesitate to call.        Sincerely,           Binta Jackson P.A.-C.  Electronically Signed

## 2022-08-09 ASSESSMENT — ENCOUNTER SYMPTOMS
NECK PAIN: 1
PALPITATIONS: 0
ABDOMINAL PAIN: 0
FEVER: 0
LOSS OF CONSCIOUSNESS: 0
BLURRED VISION: 0
CHILLS: 0
HEADACHES: 0
DIZZINESS: 0
BACK PAIN: 1
WEIGHT LOSS: 0
SHORTNESS OF BREATH: 0
VOMITING: 0
TINGLING: 0
SENSORY CHANGE: 0
NAUSEA: 0
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
## 2023-02-21 NOTE — LETTER
[address],   [address] [place] [address]  Phone:  [phone] - Fax:  [phone]   Occupational Health [facility] Progress Report and Disability Certification  Date of Service: 3/10/2021   No Show:  No  Date / Time of Next Visit: 3/24/2021   Claim Information   Patient Name: Windy Funk  Claim Number:     Employer:  [employer] Date of Injury:      Insurer / TPA: Yobany Armendariz  ID / SSN:     Occupation:   Diagnosis: Diagnoses of Closed head injury without loss of consciousness, subsequent encounter and Acute strain of neck muscle, subsequent encounter were pertinent to this visit.    Medical Information   Related to Industrial Injury? 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 Patient denies experiencing any loss of consciousness or episodes of vomiting before or after the event.  Today states she goes up and down like a roller coaster. She states that she has neck tenderness and is noticing some tingling in her fingers.  She continues to have ringing in the right ear with complete resolution in the left.  She states that her visual disturbances have resolved, her dizziness is pretty much resolved, no longer has nausea, and has no continued issues with light.  She notes that she does continue to have some issues with loud noises, [place].  She is taking the muscle relaxer which has helped in the past, now butch not seem to be working.As a result she has done nothing all week.  She states that vestibular therapy is still pending at this time.  She notes that she has not been back to work since the incident.  Plan of care discussed with patient.  Patient has obtained a .   Objective Findings: Head: No obvious deformities or discolorations noted.  Head is symmetrical and normocephalic.  EOMI's, PERRLA. Cranial  Post CVA Discharge Follow Up  Hospitalization: 2/12/23-2/16/23    Jo Ann returned the call  She reports how the patient is doing fine  She continues to be impulsive, but oriented  Denies any new or worsening stroke-like symptoms  Ambulation / ADLs:  Patient mobilizing via assist x1 with walker  She continues to require assistance with ADLs and transfers  Patient maintained on a mechanical soft diet, thin liquids with direct supervision  She remains continent at this time  Medication Review:  I reviewed medications with them  Reports adherence to medications  She denies the patient taking any AP/AC at this time  Last reported /70    Appointments:  Reminded of the patient's stroke hospital follow up  She is aware of the CT head for 2/23/23 and they will mail us the disc of imaging once completed  Address was previously provided  There is no estimated discharge date at this time  nerves I through XII intact. A x O x3.  Patient answers questions appropriately, significant improvement since last visit.  Ear canals are clear, tympanic membrane is pearly gray, no cerumen impaction noted.     Neck: Very mild TTP to the posterior neck. No spinous process step-offs or deformities to the cervical area. Trachea midline, no JVD, no cervical adenopathy or rigidity. FROM, subjective discomfort. Neg edema, bruising, or warmth noted.      CT-HEAD W/O  Final Result     1.  No CT evidence of acute infarct, hemorrhage or mass.  2.  Small white matter hypodensity in the posterior right frontal lobe may represent a benign perivascular space, sequela of demyelination or gliosis.     CT-CSPINE WITHOUT PLUS RECONS  Final Result        1. No acute fracture from C1 through T1 is visualized.   Pre-Existing Condition(s):     Assessment:   Condition Same    Status: Additional Care Required  Permanent Disability:No    Plan: Medication (NOT at Work)MedicationOT    Diagnostics:      Comments:  Follow-up in 2 weeks  Restricted duty, no safety sensitive job duties   Vestibular therapy referral pending  Recommend ice and heat application as needed   Take Meloxicam as prescribed, if ineffective okay to return to OTC Tylenol/Ibuprofen as needed     Take cyclobenzaprine as prescribed DO NOT DRIVE or WORK while taking this medication   Recommend plenty of rest, staying hydrated, and limiting exposure to stimulus     Go to ER: extreme sudden fatigue, severe headache, short-term memory loss, slu  rred speech, pale skin, changes in behavior,   constant nausea with vomiting, or loss of consciousness, limit exposure to light, screen time, and noises, Get plenty of rest    Disability Information   Status: Released to Restricted Duty    From:  3/10/2021  Through: 3/24/2021 Restrictions are: Temporary   Physical Restrictions   Sitting:    Standing:    Stooping:    Bending:  < or = to 2 hrs/day   Squatting:    Walking:    Climbing:     Pushing:      Pulling:    Other:    Reaching Above Shoulder (L):   Reaching Above Shoulder (R):       Reaching Below Shoulder (L):    Reaching Below Shoulder (R):      Not to exceed Weight Limits   Carrying(hrs):   Weight Limit(lb): < or = to 25 pounds Lifting(hrs):   Weight  Limit(lb): < or = to 25 pounds   Comments: No safety sensitive job duties until cleared    Repetitive Actions   Hands: i.e. Fine Manipulations from Grasping:     Feet: i.e. Operating Foot Controls:     Driving / Operate Machinery:     Provider Name:   ELVIS Martin Physician Signature:  Physician Name:     Clinic Name / Location: [address],   [address] [place] [address] [place] [address] Clinic Phone Number: Dept: [phone]   Appointment Time: 4:30 Pm Visit Start Time: 4:27 PM   Check-In Time:  4:25 Pm Visit Discharge Time:  5:07pm   Original-Treating Physician or Chiropractor    Page 2-Insurer/TPA    Page 3-Employer    Page 4-Employee

## 2023-10-31 ENCOUNTER — OFFICE VISIT (OUTPATIENT)
Dept: NEUROLOGY | Facility: MEDICAL CENTER | Age: 33
End: 2023-10-31
Attending: PSYCHIATRY & NEUROLOGY
Payer: COMMERCIAL

## 2023-10-31 ENCOUNTER — TELEPHONE (OUTPATIENT)
Dept: NEUROLOGY | Facility: MEDICAL CENTER | Age: 33
End: 2023-10-31
Payer: COMMERCIAL

## 2023-10-31 VITALS
SYSTOLIC BLOOD PRESSURE: 102 MMHG | DIASTOLIC BLOOD PRESSURE: 56 MMHG | TEMPERATURE: 98.9 F | BODY MASS INDEX: 25.8 KG/M2 | HEIGHT: 62 IN | OXYGEN SATURATION: 100 % | WEIGHT: 140.21 LBS | HEART RATE: 82 BPM

## 2023-10-31 DIAGNOSIS — G43.E09 CHRONIC MIGRAINE WITH AURA WITHOUT STATUS MIGRAINOSUS, NOT INTRACTABLE: Primary | ICD-10-CM

## 2023-10-31 PROCEDURE — 3074F SYST BP LT 130 MM HG: CPT | Performed by: PSYCHIATRY & NEUROLOGY

## 2023-10-31 PROCEDURE — 99205 OFFICE O/P NEW HI 60 MIN: CPT | Performed by: PSYCHIATRY & NEUROLOGY

## 2023-10-31 PROCEDURE — 3078F DIAST BP <80 MM HG: CPT | Performed by: PSYCHIATRY & NEUROLOGY

## 2023-10-31 PROCEDURE — 99211 OFF/OP EST MAY X REQ PHY/QHP: CPT | Performed by: PSYCHIATRY & NEUROLOGY

## 2023-10-31 RX ORDER — AMITRIPTYLINE HYDROCHLORIDE 10 MG/1
20 TABLET, FILM COATED ORAL NIGHTLY
Qty: 60 TABLET | Refills: 5 | Status: SHIPPED
Start: 2023-10-31 | End: 2024-01-30

## 2023-10-31 RX ORDER — SUMATRIPTAN 50 MG/1
TABLET, FILM COATED ORAL
Qty: 11 TABLET | Refills: 11 | Status: SHIPPED | OUTPATIENT
Start: 2023-10-31 | End: 2023-11-30

## 2023-10-31 RX ORDER — SUMATRIPTAN 25 MG/1
25-100 TABLET, FILM COATED ORAL
COMMUNITY
End: 2023-10-31

## 2023-10-31 ASSESSMENT — PATIENT HEALTH QUESTIONNAIRE - PHQ9: CLINICAL INTERPRETATION OF PHQ2 SCORE: 0

## 2023-10-31 NOTE — TELEPHONE ENCOUNTER
Received Refill PA request via [employer]  for Sumiatriptan (Imitrex) 50 MG Tabs. (Quantity:9, Day Supply:30) - Copay $0.00 Max qty 9 Max DS 30     Insurance: [place] Medicaid [facility]  Member ID:  [id number]  BIN: [id number]  PCN: [id number]   Group: [id number]     Ran Test claim via [facility] Pays for a $0.00 copay. Will outreach to patient to offer specialty pharmacy services and or release to preferred pharmacy

## 2023-10-31 NOTE — PROGRESS NOTES
"[facility] NEUROLOGY  GENERAL NEUROLOGY  NEW PATIENT VISIT    Referral source: Bijal Pop MD    CC: \"hemiplegia, hemiparesis\"    HISTORY OF ILLNESS:  Windy Funk is a 33 y.o., right-handed woman with a history most notable for chronic migraine, cardiac septal defect, stroke (3/2023, left PCA, embolism, on ASA 81 mg/day).  Today, she was unaccompanied, and she provided the following history:    3/4/2023:  Windy didn't feel well.  Her right hand went \"numb.\"  She attributed this to working long hours.  The following morning she couldn't grasp a cup of coffee in the right hand.  She went to ED at [facility].  She was diagnosed with a stroke.  She did not receive tPA.  She was monitored overnight.  Workup in the hospital included TTE (3/5/2023) which showed \"positive bubble study with a small amount of bubbles in the left heart during Valsalva consistent with a right to left intra-cardia shunt...\"  She was told to take ASA 81 mg/day.  She was discharged and referred to cardiology and neurology.    4/20/2023:  Windy consulted with a cardiologist at [place].  Workup included echocardiogram (ULISSES) and Holter monitor.  ULISSES was normal and showed no shunt.  The monitor showed no significant arrhythmia.  She was told that she had a PFO.    The [facility] neurologist (Dr. Morales) prescribed Eliquis (apixaban).  She continues on Eliquis.    There is residual right-sided weakness.    There are also migraines.  She was prescribed sumatriptan.  This is helpful.      The following is a summary of headache symptoms, presented in my standard format:    Family History: none  Age at onset (years): 32 (w/ stroke)  Location: bi-frontal  Radiation: posterior cervical  Frequency: 2-3/week  Duration: with treatment: \"a few hours\" at least  Headache Days/Month: 18/30  Quality: \"stabbing, pressure\"  Intensity: 8/10  Aura: visual (difficulty focusing)  Photophobia/Phonophobia/Nausea/Vomiting: yes/yes/no/no  Provoked " by Physical Activity?:   Triggers: sleep deprivation  Associated Symptoms:   Autonomic Signs (such as ptosis, miosis, conjunctival injection, rhinorrhea, increased lacrimation):   Head Trauma:   Association with Menses:   ED Visits: none  Hospitalizations: none  Missed Work Days (unemployed): n/a  Sleep (hours/night): 4-7  Caffeine Intake: 2 cups of coffee/morning  Hydration: keeps well-hydrated  Nutrition: eats in the morning, sometimes lunch, sometimes dinner, doesn't get hungry  Exercise:   Analgesic Overuse:     Current Medication Regimen:  - sumatriptan 25 mg/day is sometimes helpful, sometimes re-doses    Medications Tried: Response  Preventive:  -     Rescue:  -     Medications Not Tried:  -     MEDICAL AND SURGICAL HISTORY:  No past medical history on file.  No past surgical history on file.    MEDICATIONS:  Current Outpatient Medications   Medication Sig    apixaban (ELIQUIS) 5mg Tab     amitriptyline (ELAVIL) 10 MG Tab Take 2 Tablets by mouth every evening for 180 days.    SUMAtriptan (IMITREX) 50 MG Tab Take  mg at the onset of aura/HA; may re-dose x1 after 2 hrs if HA persists; MDD: 200 mg; do not use >2 days/week.     SOCIAL HISTORY:  Social History     Tobacco Use    Smoking status: Light Smoker    Smokeless tobacco: Never   Substance Use Topics    Alcohol use: Yes     Comment: occ     Social History     Social History Narrative    Not on file     FAMILY HISTORY:  No family history on file.    REVIEW OF SYSTEMS:  A ROS was completed.  Pertinent positives and negatives were included in the HPI, above.  All other systems were reviewed and are negative.    PHYSICAL EXAM:  General/Medical:  - NAD    Neuro:  MENTAL STATUS: awake and alert; no deficits of speech or language; oriented to conversation; affect was appropriate to situation; pleasant, cooperative    CRANIAL NERVES:    II: acuity: NT, fields: NT, pupils: NT, discs: NT    III/IV/VI: versions: grossly intact    V: facial sensation: NT    VII:  facial expression: symmetric    VIII: hearing: intact to voice    IX/X: palate: NT    XI: shoulder shrug: NT    XII: tongue: NT    MOTOR:  - bulk: NT  - tone: NT  Upper Extremity Strength (R/L)    5/5   Elbow flexion 5/5   Elbow extension 5/5   Shoulder abduction 5/5     Lower Extremity Strength (R/L)   Hip flexion 4/5   Knee extension 4/5   Knee flexion NT   Ankle plantarflexion NT   Ankle dorsiflexion NT     - pronator drift: NT  - abnormal movements: none    SENSATION:  - light touch: NT  - vibration (R/L, seconds): NT at the great toes  - pinprick: NT  - proprioception: NT  - Romberg: NT    COORDINATION:  - finger to nose: NT  - finger tapping: NT    REFLEXES:  Reflex Right Left   BR NT NT   Biceps NT NT   Triceps NT NT   Patellae NT NT   Achilles NT NT   Toes NT NT     GAIT:  - right asia-paretic    REVIEW OF IMAGING STUDIES:  No recent data available.    REVIEW OF LABORATORY STUDIES:  Nor recent data available.    ASSESSMENT:  Windy Funk is a 33 y.o. woman with right-handed woman with a history most notable for chronic migraine with aura, cardiac septal defect (though there is no record of this and echocardiograms were normal), stroke (3/2023, left PCA, embolism of unknown source, on apixaban).  I am unsure what to make of the contradictory echocardiogram reports.  A TTE performed locally apparently showed an intra-cardiac shunt, while ULISSES performed [facility] showed no shunt.  She is currently taking apixaban, but I'm not sure this is indicated (no evidence of AF).  I would like her to consult with our stroke team.  I prescribed amitriptyline for migraine prevention.    PLAN:  History of Stroke (3/2023, left PCA, ?embolic, on apixaban)  - continue apixaban for now  - follow up with stroke team    Migraine w/ Aura:  Prevention:  - start amitriptyline with a goal dosage of 20 mg nightly  - get 7-9 hours of sleep per night; can try supplementing melatonin 2-10 mg, 2-3 hours before  "bedtime  - drink plenty of fluids (urine should be nearly clear)  - avoid excessive caffeine intake (no more than 2 servings per day and nothing in the afternoon)  - eat regular meals (don't skip meals)  - get moderate exercise (even just a 20 minute walk daily)    Rescue:  - continue sumatriptan 50 mg: take this at the onset of aura or headache pain; may re-dose x1 after 2 hours if headache persists; if you find you often have to re-dose after 2 hours, simply take 100 mg at the onset of headache pain; do not use more than 2 days/week  - do not use analgesics (e.g., ibuprofen, acetaminophen) more than 2 days per week in order to avoid analgesic rebound headaches    - keep a headache log    Follow-Up:  - Return in about 3 months (around 1/31/2024).    Signed: Ozzy Kim M.D.    BILLING DOCUMENTATION:   I spent 61 minutes reviewing the medical record, interviewing and examining the patient, discussing my impression (see \"assessment\" above), and coordinating care.  "

## 2023-11-03 ENCOUNTER — OFFICE VISIT (OUTPATIENT)
Dept: NEUROLOGY | Facility: MEDICAL CENTER | Age: 33
End: 2023-11-03
Attending: PSYCHIATRY & NEUROLOGY
Payer: COMMERCIAL

## 2023-11-03 ENCOUNTER — TELEPHONE (OUTPATIENT)
Dept: NEUROLOGY | Facility: MEDICAL CENTER | Age: 33
End: 2023-11-03

## 2023-11-03 VITALS
BODY MASS INDEX: 25.96 KG/M2 | DIASTOLIC BLOOD PRESSURE: 70 MMHG | OXYGEN SATURATION: 98 % | HEIGHT: 62 IN | SYSTOLIC BLOOD PRESSURE: 110 MMHG | WEIGHT: 141.09 LBS | TEMPERATURE: 99.3 F | HEART RATE: 91 BPM

## 2023-11-03 DIAGNOSIS — Q21.12 PATENT FORAMEN OVALE WITH RIGHT TO LEFT SHUNT: ICD-10-CM

## 2023-11-03 DIAGNOSIS — Z86.73 HISTORY OF ISCHEMIC VERTEBROBASILAR ARTERY THALAMIC STROKE: Primary | ICD-10-CM

## 2023-11-03 PROCEDURE — 3078F DIAST BP <80 MM HG: CPT | Performed by: PSYCHIATRY & NEUROLOGY

## 2023-11-03 PROCEDURE — 3074F SYST BP LT 130 MM HG: CPT | Performed by: PSYCHIATRY & NEUROLOGY

## 2023-11-03 PROCEDURE — 99215 OFFICE O/P EST HI 40 MIN: CPT | Performed by: PSYCHIATRY & NEUROLOGY

## 2023-11-03 PROCEDURE — 99211 OFF/OP EST MAY X REQ PHY/QHP: CPT | Performed by: PSYCHIATRY & NEUROLOGY

## 2023-11-03 RX ORDER — ATORVASTATIN CALCIUM 40 MG/1
40 TABLET, FILM COATED ORAL NIGHTLY
Qty: 90 TABLET | Refills: 2 | Status: SHIPPED | OUTPATIENT
Start: 2023-11-03

## 2023-11-03 NOTE — PATIENT INSTRUCTIONS
Go to the lab to have your blood drawn for blood clotting disorders     START atorvastatin 40mg daily    I have ordered a brain MRI scan and we will review the MRI brain at the next visit   Continue Eliquis for now until the next visit     I would hold off on PFO closure for now      Statement Selected

## 2023-11-03 NOTE — PROGRESS NOTES
"Chief Complaint   Patient presents with    Establish Care     Ref by josé miguel; Residual effects of stroke        History of present illness:  Windy Funk 33 y.o. female with history of migraine and history of left thalamic stroke in 3/2023, with possible PFO. She had a TTE  which showed \"positive bubble study with a small amount of bubbles in the left heart during Valsalva consistent with a right to left intra-cardiac shunt.\"  However, ULISSES at [place] was normal and showed no shunt.   No history of diabetes, HTN, HL.   No family history of cerebrovascular disease or hematologic disorder.   She was prescribed Eliquis by a neurologist at [facility].   No history of cigarette smoking.     Past medical history:   No past medical history on file.    Past surgical history:   No past surgical history on file.    Family history:   No family history on file.    Social history:   Social History     Socioeconomic History    Marital status: Single     Spouse name: Not on file    Number of children: Not on file    Years of education: Not on file    Highest education level: Not on file   Occupational History    Not on file   Tobacco Use    Smoking status: Light Smoker    Smokeless tobacco: Never   Vaping Use    Vaping Use: Some days   Substance and Sexual Activity    Alcohol use: Yes     Comment: occ    Drug use: No    Sexual activity: Yes     Partners: Female   Other Topics Concern    Not on file   Social History Narrative    Not on file     Social Determinants of Health     Financial Resource Strain: Not on file   Food Insecurity: Not on file   Transportation Needs: Not on file   Physical Activity: Not on file   Stress: Not on file   Social Connections: Not on file   Intimate Partner Violence: Not on file   Housing Stability: Not on file       Current medications:   Current Outpatient Medications   Medication    atorvastatin (LIPITOR) 40 MG Tab    apixaban (ELIQUIS) 5mg Tab    amitriptyline (ELAVIL) 10 MG " "Tab    SUMAtriptan (IMITREX) 50 MG Tab     No current facility-administered medications for this visit.       Medication Allergy:  Allergies   Allergen Reactions    Percocet [Oxycodone-Acetaminophen] Hives       Physical examination:   Vitals:    11/03/23 1422   BP: 110/70   BP Location: Right arm   Patient Position: Sitting   BP Cuff Size: Adult   Pulse: 91   Temp: 37.4 °C (99.3 °F)   TempSrc: Temporal   SpO2: 98%   Weight: 64 kg (141 lb 1.5 oz)   Height: 1.575 m (5' 2\")     Neurological Exam    Cranial Nerves  CN II: Right normal visual field. Left normal visual field.    Motor   Right hemiparesis.    Sensory  Light touch abnormality: Decreased sensation over the right hip, knee, shoulder     .     Coordination  Right: Finger-to-nose normal.Left: Finger-to-nose normal.    Gait    Right hemiparetic gait .      Labs:  I reviewed the following labs personally:  None     Imaging:   ULISSES  SUMMARY:      1. ULISSES to assess PFO. HX of stroke.    2. Non-dilated, non-hypertrophied left ventricle, with normal wall motion and ejection fraction.    3. Left ventricular ejection fraction was approximately 60 to 65%.    4. Normal-sized aortic root.    5. Normal structure and function of mitral valve.    6. Trileaflet aortic valve.    7. Normal systolic right ventricular function.    8. No right to left shunt across foramen ovale, using 10ml of agitated saline contrast (TTE), during Valsalva maneuver.    9. No significant pericardial effusion.     Holter Monitor Results:   8-14 Days start date 04/20/2023; readable data 13 days and 22 hours.   1. The main rhythm was sinus with a minimum heart rate of 32 BPM (at 4:48 am), maximum heart rate of 153 BPM (at 1:28 pm), and an average heart rate of 79 BPM.   2. There were rare PACs, isolated of 29 beats (or <1 %), no couplets, no triplets, and no runs of Atrial Tachycardia.   3. There were rare PVCs, isolated of 57 beats (or <1 %), 5 couplets, no triplets, and no runs of Ventricular " Tachycardia.   4. There was evidence of AV block. Second Degree AV Block-Mobitz I (wenckebach) was present.   5. No pauses noted.   6. Patient reported symptoms: lightheaded, fluttering/racing, pain/tingling (in neck or arm), skipped/irregular beat(s), SOB, pounding.     Holter Monitor Final Impression:   8-14 Days Suggest clinical correlation of cardiac monitor findings.   No significant arrhythmia.   3 triggered events: associated with sinus rhythm. Symptoms associated with sinus rhythm.   Mobitz type I second degree AV block present (occurred at 4:42am and 4:48am) - not triggered.   Rare PACs and PVCs.      CT brain with and without  contrast: Normal CT angiogram of  the cerebral arteries: Normal CT  angiogram of the carotid  arteries: Carotid vertebral arteries widely patent    3/4/23 MRI Brain   Acute 8mm lacunar type  infarction periphery left  thalamus probably 2-3 days old    ASSESSMENT AND PLAN:  Problem List Items Addressed This Visit       History of ischemic vertebrobasilar artery thalamic stroke - Primary    Relevant Medications    atorvastatin (LIPITOR) 40 MG Tab    apixaban (ELIQUIS) 5mg Tab    Other Relevant Orders    ANTICARDIOLIPIN AB IGG,IGM,IGA    LUPUS ANTICOAGULANT    BETA-2 GLYCOPROTEIN I AB,G,A,M    FACTOR V LEIDEN MUTATION    FACTOR II DNA ANALYSIS    PROTEIN C FUNCTIONAL    PROTEIN S FUNCTIONAL    ANTITHROMBIN ACTIVITY    Lipid Profile    HEMOGLOBIN A1C    MR-BRAIN-W/O       1. History of ischemic vertebrobasilar artery thalamic stroke  - ANTICARDIOLIPIN AB IGG,IGM,IGA; Future  - LUPUS ANTICOAGULANT; Future  - BETA-2 GLYCOPROTEIN I AB,G,A,M  - FACTOR V LEIDEN MUTATION; Future  - FACTOR II DNA ANALYSIS; Future  - PROTEIN C FUNCTIONAL; Future  - PROTEIN S FUNCTIONAL; Future  - ANTITHROMBIN ACTIVITY  - Lipid Profile; Future  - HEMOGLOBIN A1C; Future  - atorvastatin (LIPITOR) 40 MG Tab; Take 1 Tablet by mouth every evening.  Dispense: 90 Tablet; Refill: 2  - MR-BRAIN-W/O; Future  - apixaban  (ELIQUIS) 5mg Tab; Take 1 Tablet by mouth 2 times a day.  Dispense: 60 Tablet; Refill: 1    33-year-old female with history of left thalamic stroke.  She was placed on Eliquis for unclear reasons.  I do not have the images of the brain MRI scan therefore I reordered a MRI brain without contrast.  If the stroke is consistent with a lacunar small vessel stroke, I am less inclined to pursue closure of her patent foramen ovale.  In addition, it is not clear if she has a patent foramen ovale given that there is conflicting results from the transthoracic and transesophageal echo.  She is to follow-up with cardiology to clarify this question.    Ordered a hypercoagulable work-up, including venous hypercoagulable states given the possibility of a patent foramen ovale.  In addition I have ordered lipid panel and hemoglobin A1c.  She does not have a history consistent with vascular risk factors.  She is also a non-smoker. I will plan to discontinue Eliquis and put her on a antiplatelet drug if the hypercoagulable work-up is normal.  She will follow-up in 1 month.     FOLLOW-UP:   Return in about 1 month (around 12/3/2023).    Total time spent for the day for this patient unrelated to procedure time is: 60 minutes. I spent 20 minutes in face to face time and I spent 31 minutes pre-charting/chart and literature review and 9 minutes in post-visit documentation.      Dr. Robby Van D.O.  [facility] Neurology     No

## 2023-11-03 NOTE — TELEPHONE ENCOUNTER
Received Refill PA request via [employer]  for Eliquis (Apixaban) 5 MG Tabs. (Quantity:60, Day Supply:30) - Copay $0.00     Insurance: [facility] Medicaid  Member ID:  [id number]  BIN: [id number]  PCN: [id number]  Group: [id number]     Ran Test claim via [place] & medication Pays for a $0.00 copay. Will outreach to patient to offer specialty pharmacy services and or release to preferred pharmacy

## 2023-11-22 ENCOUNTER — HOSPITAL ENCOUNTER (OUTPATIENT)
Dept: RADIOLOGY | Facility: MEDICAL CENTER | Age: 33
End: 2023-11-22
Attending: PSYCHIATRY & NEUROLOGY
Payer: COMMERCIAL

## 2023-11-22 ENCOUNTER — TELEPHONE (OUTPATIENT)
Dept: NEUROLOGY | Facility: MEDICAL CENTER | Age: 33
End: 2023-11-22

## 2023-11-22 DIAGNOSIS — Z86.73 HISTORY OF ISCHEMIC VERTEBROBASILAR ARTERY THALAMIC STROKE: ICD-10-CM

## 2023-11-22 PROCEDURE — 70551 MRI BRAIN STEM W/O DYE: CPT

## 2023-11-22 NOTE — TELEPHONE ENCOUNTER
1. Caller Name: Clint Cortez PT    2. Call Back Number: [phone]        3. How would the patient prefer to be contacted with a response: Phone call OK to leave a detailed message      Patient was seen and been having migraines for 3 days. Experiencing right sided weakness. Patient is significantly weaker with strength measurement hand  decreased to 50 lbs and pinch  to 10 lbs. Concerned about worsening migraine and was told on last neuro office visit that patient needed to be on the new medication for 2 weeks. Physical therapy state will send the progress notes. DENISSE Altamirano.

## 2023-11-22 NOTE — TELEPHONE ENCOUNTER
Dr. Kim is seeing this patient for migraine. Please have him provide treatment recommendations for migraine.     Dr. Robby Van D.O.  [facility]

## 2023-11-23 NOTE — TELEPHONE ENCOUNTER
I reached out to Windy by phone.  There was no answer, but I left a detailed voicemail message.    -PEGGY

## 2023-11-29 ENCOUNTER — TELEPHONE (OUTPATIENT)
Dept: NEUROLOGY | Facility: MEDICAL CENTER | Age: 33
End: 2023-11-29
Payer: COMMERCIAL

## 2023-11-29 NOTE — TELEPHONE ENCOUNTER
NEUROLOGY PATIENT PRE-VISIT PLANNING     Patient was NOT contacted to complete PVP.  Note: Patient will not be contacted if there is no indication to call.     Patient Appointment is scheduled as: Established Patient     Is visit type and length scheduled correctly? Yes    [employer] Patient is checked in Patient Demographics? Yes    3.   Is referral attached to visit? Yes    4. Were records received from referring provider? Yes    4. Patient was NOT contacted to have someone accompany them to visit.     5. Is this appointment scheduled as a Hospital Follow-Up?  No    6. Does the patient require any pre procedure or post procedure follow up? No    7. If any orders were placed at last visit or intended to be done for this visit do we have Results/Consult Notes? No  Labs - Called the patient and LVM to ask regarding lab works that was ordered by Dr. Van not completed. Call back number provided for patient.   Imaging - Imaging was not ordered at last office visit.  Referrals - No referrals were ordered at last office visit.  Note: If patient appointment is for lab or imaging review and patient did not complete the studies, check with provider if OK to reschedule patient until completed.    8. If patient appointment is for Botox - is order pended for provider? No    9. Was Plan Assessment from last Neurology Office Visit Reviewed?  Yes

## 2023-12-06 ENCOUNTER — TELEMEDICINE (OUTPATIENT)
Dept: NEUROLOGY | Facility: MEDICAL CENTER | Age: 33
End: 2023-12-06
Attending: PSYCHIATRY & NEUROLOGY
Payer: COMMERCIAL

## 2023-12-06 VITALS — WEIGHT: 141 LBS | HEIGHT: 62 IN | BODY MASS INDEX: 25.95 KG/M2

## 2023-12-06 DIAGNOSIS — G37.9 DEMYELINATING DISEASE (HCC): Primary | ICD-10-CM

## 2023-12-06 PROCEDURE — 99214 OFFICE O/P EST MOD 30 MIN: CPT | Mod: 95 | Performed by: PSYCHIATRY & NEUROLOGY

## 2023-12-06 NOTE — PROGRESS NOTES
"    Chief Complaint   Patient presents with    Follow-Up     History of ischemic vertebrobasilar artery thalamic stroke       History of present illness:  Windy Funk 33 y.o. female with history of migraine and history of left thalamic stroke in 3/2023, with possible PFO. She had a TTE  which showed \"positive bubble study with a small amount of bubbles in the left heart during Valsalva consistent with a right to left intra-cardiac shunt.\"  However, ULISSES at [place] was normal and showed no shunt.   No history of diabetes, HTN, HL.   No family history of cerebrovascular disease or hematologic disorder.   She was prescribed Eliquis by a neurologist at [facility].   No history of cigarette smoking.      12/6/23: MRI was completed, which demonstrates a lesion extending from the left cerebral peduncle to the left thalamus. The radiologist has read this as being consistent with demyelinating disease.    She describes the initial event in March as causing R sided numbness, followed by slurred speech 2 days later. She continues to have persistent deficits since then. She has improved about 25-50% since symptom onset.     Past medical history:   No past medical history on file.    Past surgical history:   No past surgical history on file.    Family history:   No family history on file.    Social history:   Social History     Socioeconomic History    Marital status: Single     Spouse name: Not on file    Number of children: Not on file    Years of education: Not on file    Highest education level: Not on file   Occupational History    Not on file   Tobacco Use    Smoking status: Light Smoker    Smokeless tobacco: Never   Vaping Use    Vaping Use: Some days   Substance and Sexual Activity    Alcohol use: Yes     Comment: occ    Drug use: No    Sexual activity: Yes     Partners: Female   Other Topics Concern    Not on file   Social History Narrative    Not on file     Social Determinants of Health " "    Financial Resource Strain: Not on file   Food Insecurity: Not on file   Transportation Needs: Not on file   Physical Activity: Not on file   Stress: Not on file   Social Connections: Not on file   Intimate Partner Violence: Not on file   Housing Stability: Not on file       Current medications:   Current Outpatient Medications   Medication    atorvastatin (LIPITOR) 40 MG Tab    amitriptyline (ELAVIL) 10 MG Tab     No current facility-administered medications for this visit.       Medication Allergy:  Allergies   Allergen Reactions    Percocet [Oxycodone-Acetaminophen] Hives       Physical examination:   Vitals:    23 1550   Weight: 64 kg (141 lb)   Height: 1.575 m (5' 2\")     Labs:  I reviewed the following labs personally:  No results found for: \"HBA1C\"   No results found for: \"CHOLSTRLTOT\", \"TRIGLYCERIDE\", \"HDL\", \"LDL\", \"LDLCALC\", \"CHOLHDLRAT\", \"NONHDL\"    Imagin23 MRI BRAIN W/O   I reviewed the images personally and agree with the following read:   IMPRESSION:        Multiple foci of abnormal signal involving the white matter of both cerebral hemispheres, the left cerebral peduncle, left thalamus, left brainstem. An underlying demyelinating process is a strong consideration. Recommend additional evaluation with   contrast enhanced brain MRI images and if indicated, an MRI of the cervical spine. Comparison with prior films will be helpful.    ASSESSMENT AND PLAN:  Problem List Items Addressed This Visit    None  Visit Diagnoses       Demyelinating disease (HCC)    -  Primary    Relevant Orders    MR-BRAIN-WITH & W/O    MR-CERVICAL SPINE-WITH & W/O    MR-THORACIC SPINE-WITH & W/O    DX-LUMBAR PUNCTURE FOR DIAGNOSIS    CSF Cell Count    CSF PROTEIN    CSF GLUCOSE    CSF CULTURE    OLIGOCLONAL BANDS SERUM/CSF    AQUAPORIN-4 RECEPTOR AB    MOG IGG W/RFLX TITER,SERUM            1. Demyelinating disease (HCC)  - MR-BRAIN-WITH & W/O; Future  - MR-CERVICAL SPINE-WITH & W/O; Future  - MR-THORACIC " SPINE-WITH & W/O; Future  - DX-LUMBAR PUNCTURE FOR DIAGNOSIS; Future  - CSF Cell Count; Future  - CSF PROTEIN; Future  - CSF GLUCOSE; Future  - CSF CULTURE; Future  - OLIGOCLONAL BANDS SERUM/CSF; Future  - AQUAPORIN-4 RECEPTOR AB; Future  - MOG IGG W/RFLX TITER,SERUM; Future    I have reviewed the most recent brain scan performed at [facility] in November of this year.  Lesion that extends from the left midbrain to the left thalamus.  This longitudinally extensive lesion is unusual for ischemic stroke.  In addition, there are 2 small T2 hyperintense lesions in the white matter of the right hemisphere that look concerning for demyelinating disease.  I counseled the patient that I suspect she has multiple sclerosis.  She does have a history of migraines however the longitudinally extensive lesion should not be secondary to migraine.  Patient is scheduled to see Dr. Kim in January, who will provide his input regarding the diagnosis as well.  I have ordered MRI of the brain, and spinal cord with contrast as well as a lumbar puncture and have ordered the CSF and serum studies to assess for demyelinating disease.  She will discontinue Eliquis, given that does not seem consistent with stroke as well as no evidence of need for anticoagulation.    FOLLOW-UP:   Return if symptoms worsen or fail to improve.    Total time spent for the day for this patient unrelated to procedure time is: 34 minutes. I spent 23 minutes in face to face time and I spent 2 minutes pre-charting and 7 minutes in post-visit documentation.      Dr. Robby Van D.O.  [facility] Neurology

## 2024-01-08 ENCOUNTER — HOSPITAL ENCOUNTER (OUTPATIENT)
Dept: LAB | Facility: MEDICAL CENTER | Age: 34
End: 2024-01-08
Attending: PSYCHIATRY & NEUROLOGY
Payer: COMMERCIAL

## 2024-01-08 DIAGNOSIS — Z86.73 HISTORY OF ISCHEMIC VERTEBROBASILAR ARTERY THALAMIC STROKE: ICD-10-CM

## 2024-01-08 DIAGNOSIS — G37.9 DEMYELINATING DISEASE (HCC): ICD-10-CM

## 2024-01-08 LAB
APTT PPP: 35.7 SEC (ref 24.7–36)
BASOPHILS # BLD AUTO: 0.7 % (ref 0–1.8)
BASOPHILS # BLD: 0.03 K/UL (ref 0–0.12)
CHOLEST SERPL-MCNC: 175 MG/DL (ref 100–199)
EOSINOPHIL # BLD AUTO: 0.05 K/UL (ref 0–0.51)
EOSINOPHIL NFR BLD: 1.1 % (ref 0–6.9)
ERYTHROCYTE [DISTWIDTH] IN BLOOD BY AUTOMATED COUNT: 38.5 FL (ref 35.9–50)
EST. AVERAGE GLUCOSE BLD GHB EST-MCNC: 85 MG/DL
HBA1C MFR BLD: 4.6 % (ref 4–5.6)
HCT VFR BLD AUTO: 45.1 % (ref 37–47)
HDLC SERPL-MCNC: 62 MG/DL
HGB BLD-MCNC: 15.3 G/DL (ref 12–16)
IMM GRANULOCYTES # BLD AUTO: 0.01 K/UL (ref 0–0.11)
IMM GRANULOCYTES NFR BLD AUTO: 0.2 % (ref 0–0.9)
INR PPP: 1.05 (ref 0.87–1.13)
LDLC SERPL CALC-MCNC: 94 MG/DL
LYMPHOCYTES # BLD AUTO: 1.76 K/UL (ref 1–4.8)
LYMPHOCYTES NFR BLD: 39.5 % (ref 22–41)
MCH RBC QN AUTO: 30.5 PG (ref 27–33)
MCHC RBC AUTO-ENTMCNC: 33.9 G/DL (ref 32.2–35.5)
MCV RBC AUTO: 90 FL (ref 81.4–97.8)
MONOCYTES # BLD AUTO: 0.36 K/UL (ref 0–0.85)
MONOCYTES NFR BLD AUTO: 8.1 % (ref 0–13.4)
NEUTROPHILS # BLD AUTO: 2.25 K/UL (ref 1.82–7.42)
NEUTROPHILS NFR BLD: 50.4 % (ref 44–72)
NRBC # BLD AUTO: 0 K/UL
NRBC BLD-RTO: 0 /100 WBC (ref 0–0.2)
PLATELET # BLD AUTO: 352 K/UL (ref 164–446)
PMV BLD AUTO: 10.4 FL (ref 9–12.9)
PROTHROMBIN TIME: 13.9 SEC (ref 12–14.6)
RBC # BLD AUTO: 5.01 M/UL (ref 4.2–5.4)
TRIGL SERPL-MCNC: 96 MG/DL (ref 0–149)
WBC # BLD AUTO: 4.5 K/UL (ref 4.8–10.8)

## 2024-01-08 PROCEDURE — 86362 MOG-IGG1 ANTB CBA EACH: CPT

## 2024-01-08 PROCEDURE — 85730 THROMBOPLASTIN TIME PARTIAL: CPT

## 2024-01-08 PROCEDURE — 81241 F5 GENE: CPT

## 2024-01-08 PROCEDURE — 85610 PROTHROMBIN TIME: CPT

## 2024-01-08 PROCEDURE — 83036 HEMOGLOBIN GLYCOSYLATED A1C: CPT

## 2024-01-08 PROCEDURE — 85025 COMPLETE CBC W/AUTO DIFF WBC: CPT

## 2024-01-08 PROCEDURE — 85306 CLOT INHIBIT PROT S FREE: CPT

## 2024-01-08 PROCEDURE — 81240 F2 GENE: CPT

## 2024-01-08 PROCEDURE — 85520 HEPARIN ASSAY: CPT

## 2024-01-08 PROCEDURE — 85303 CLOT INHIBIT PROT C ACTIVITY: CPT

## 2024-01-08 PROCEDURE — 85300 ANTITHROMBIN III ACTIVITY: CPT

## 2024-01-08 PROCEDURE — 80061 LIPID PANEL: CPT

## 2024-01-08 PROCEDURE — 86052 AQUAPORIN-4 ANTB CBA EACH: CPT

## 2024-01-08 PROCEDURE — 86147 CARDIOLIPIN ANTIBODY EA IG: CPT

## 2024-01-08 PROCEDURE — 86146 BETA-2 GLYCOPROTEIN ANTIBODY: CPT | Mod: 91

## 2024-01-08 PROCEDURE — 36415 COLL VENOUS BLD VENIPUNCTURE: CPT

## 2024-01-08 PROCEDURE — 85613 RUSSELL VIPER VENOM DILUTED: CPT

## 2024-01-09 LAB
APTT PPP: 35.3 SEC (ref 24.7–36)
INR PPP: 1.04 (ref 0.87–1.13)
LA PPP-IMP: NORMAL
LMWH PPP CHRO-ACNC: <0.1 U/ML
PROTHROMBIN TIME: 13.8 SEC (ref 12–14.6)
SCREEN DRVVT: 37.5 SEC (ref 28–48)

## 2024-01-10 LAB
AT III ACT/NOR PPP CHRO: 93 % (ref 76–128)
B2 GLYCOPROT1 IGA SER-ACNC: <10 SAU
B2 GLYCOPROT1 IGG SERPL IA-ACNC: <10 SGU
B2 GLYCOPROT1 IGM SERPL IA-ACNC: <10 SMU
PROT C ACT/NOR PPP: 159 % (ref 83–168)
PROT S ACT/NOR PPP: 64 % (ref 57–131)

## 2024-01-11 LAB
CARDIOLIPIN IGA SER IA-ACNC: <10 APL
CARDIOLIPIN IGG SER IA-ACNC: <10 GPL
CARDIOLIPIN IGM SER IA-ACNC: 33 MPL

## 2024-01-12 ENCOUNTER — HOSPITAL ENCOUNTER (OUTPATIENT)
Dept: RADIOLOGY | Facility: MEDICAL CENTER | Age: 34
End: 2024-01-12
Attending: PSYCHIATRY & NEUROLOGY
Payer: COMMERCIAL

## 2024-01-12 DIAGNOSIS — G37.9 DEMYELINATING DISEASE (HCC): ICD-10-CM

## 2024-01-12 LAB
AQP4 H2O CHANNEL IGG SERPL QL IF: NORMAL
MOG AB SER QL CBA IFA: NORMAL

## 2024-01-12 PROCEDURE — 70553 MRI BRAIN STEM W/O & W/DYE: CPT

## 2024-01-12 PROCEDURE — 72156 MRI NECK SPINE W/O & W/DYE: CPT

## 2024-01-12 PROCEDURE — 700117 HCHG RX CONTRAST REV CODE 255: Mod: UD | Performed by: PSYCHIATRY & NEUROLOGY

## 2024-01-12 PROCEDURE — 72157 MRI CHEST SPINE W/O & W/DYE: CPT

## 2024-01-12 PROCEDURE — A9579 GAD-BASE MR CONTRAST NOS,1ML: HCPCS | Mod: UD | Performed by: PSYCHIATRY & NEUROLOGY

## 2024-01-12 RX ADMIN — GADOTERIDOL 13 ML: 279.3 INJECTION, SOLUTION INTRAVENOUS at 10:27

## 2024-01-13 LAB
F2 C.20210G>A GENO BLD/T: NEGATIVE
F5 P.R506Q BLD/T QL: NEGATIVE

## 2024-01-22 ENCOUNTER — HOSPITAL ENCOUNTER (OUTPATIENT)
Dept: RADIOLOGY | Facility: MEDICAL CENTER | Age: 34
End: 2024-01-22
Attending: PSYCHIATRY & NEUROLOGY
Payer: COMMERCIAL

## 2024-01-22 ENCOUNTER — HOSPITAL ENCOUNTER (OUTPATIENT)
Facility: MEDICAL CENTER | Age: 34
End: 2024-01-22
Attending: PSYCHIATRY & NEUROLOGY
Payer: COMMERCIAL

## 2024-01-22 DIAGNOSIS — G37.9 DEMYELINATING DISEASE (HCC): ICD-10-CM

## 2024-01-22 LAB
BURR CELLS/RBC NFR CSF MANUAL: 0 %
BURR CELLS/RBC NFR CSF MANUAL: 0 %
CLARITY CSF: CLEAR
CLARITY CSF: CLEAR
COLOR CSF: COLORLESS
COLOR CSF: COLORLESS
COLOR SPUN CSF: COLORLESS
COLOR SPUN CSF: COLORLESS
CSF COMMENTS 1658: NORMAL
CSF COMMENTS 1658: NORMAL
GLUCOSE CSF-MCNC: 55 MG/DL (ref 40–80)
NUC CELL # CSF: 4 CELLS/UL (ref 0–10)
NUC CELL # CSF: 9 CELLS/UL (ref 0–10)
PROT CSF-MCNC: 29 MG/DL (ref 15–45)
RBC # CSF: 1 CELLS/UL
RBC # CSF: 7 CELLS/UL
SPECIMEN VOL CSF: 13 ML
SPECIMEN VOL CSF: 13 ML
TUBE # CSF: 3
TUBE # CSF: 3
TUBE # CSF: NORMAL
TUBE # CSF: NORMAL

## 2024-01-22 PROCEDURE — 62328 DX LMBR SPI PNXR W/FLUOR/CT: CPT

## 2024-01-22 PROCEDURE — 83916 OLIGOCLONAL BANDS: CPT

## 2024-01-22 PROCEDURE — 82945 GLUCOSE OTHER FLUID: CPT

## 2024-01-22 PROCEDURE — 84157 ASSAY OF PROTEIN OTHER: CPT

## 2024-01-22 PROCEDURE — 89051 BODY FLUID CELL COUNT: CPT | Mod: 91

## 2024-01-25 LAB
OLIGOCLONAL BANDS CSF ELPH-IMP: ABNORMAL
OLIGOCLONAL BANDS CSF IEF: 3 BANDS (ref 0–1)
OLIGOCLONAL BANDS.IT SER+CSF QL: POSITIVE

## 2024-01-30 ENCOUNTER — OFFICE VISIT (OUTPATIENT)
Dept: NEUROLOGY | Facility: MEDICAL CENTER | Age: 34
End: 2024-01-30
Attending: PSYCHIATRY & NEUROLOGY
Payer: COMMERCIAL

## 2024-01-30 VITALS
WEIGHT: 140.21 LBS | RESPIRATION RATE: 16 BRPM | HEART RATE: 72 BPM | SYSTOLIC BLOOD PRESSURE: 112 MMHG | OXYGEN SATURATION: 99 % | BODY MASS INDEX: 25.8 KG/M2 | HEIGHT: 62 IN | DIASTOLIC BLOOD PRESSURE: 68 MMHG | TEMPERATURE: 98.7 F

## 2024-01-30 DIAGNOSIS — G43.E09 CHRONIC MIGRAINE WITH AURA WITHOUT STATUS MIGRAINOSUS, NOT INTRACTABLE: ICD-10-CM

## 2024-01-30 DIAGNOSIS — G35 MULTIPLE SCLEROSIS (HCC): Primary | ICD-10-CM

## 2024-01-30 PROCEDURE — 3074F SYST BP LT 130 MM HG: CPT | Performed by: PSYCHIATRY & NEUROLOGY

## 2024-01-30 PROCEDURE — 99211 OFF/OP EST MAY X REQ PHY/QHP: CPT | Performed by: PSYCHIATRY & NEUROLOGY

## 2024-01-30 PROCEDURE — 99417 PROLNG OP E/M EACH 15 MIN: CPT | Performed by: PSYCHIATRY & NEUROLOGY

## 2024-01-30 PROCEDURE — 3078F DIAST BP <80 MM HG: CPT | Performed by: PSYCHIATRY & NEUROLOGY

## 2024-01-30 PROCEDURE — 99215 OFFICE O/P EST HI 40 MIN: CPT | Performed by: PSYCHIATRY & NEUROLOGY

## 2024-01-30 RX ORDER — AMITRIPTYLINE HYDROCHLORIDE 25 MG/1
25 TABLET, FILM COATED ORAL NIGHTLY
Qty: 90 TABLET | Refills: 3 | Status: SHIPPED | OUTPATIENT
Start: 2024-01-30 | End: 2024-02-08 | Stop reason: SDUPTHER

## 2024-01-30 NOTE — PATIENT INSTRUCTIONS
Infusions:  - Ocrevus  - Briumvi  - Tysabri    Injections:  - Kesimpta    Pills:  - Mavenclad  - Gilenya  - Zeposia  - Ponvory   Patient notified that dr Flower Jasso would like her to have MRI Brain w/wo contrast. Patient verbalized understanding but states would also like Dr Flower Jasso know the pain she is experiencing starts in the neck and goes down to Trapezius area (feels like a bur

## 2024-02-08 ENCOUNTER — OFFICE VISIT (OUTPATIENT)
Dept: NEUROLOGY | Facility: MEDICAL CENTER | Age: 34
End: 2024-02-08
Attending: PSYCHIATRY & NEUROLOGY
Payer: COMMERCIAL

## 2024-02-08 VITALS
BODY MASS INDEX: 25.65 KG/M2 | TEMPERATURE: 98.3 F | SYSTOLIC BLOOD PRESSURE: 110 MMHG | RESPIRATION RATE: 16 BRPM | DIASTOLIC BLOOD PRESSURE: 68 MMHG | OXYGEN SATURATION: 98 % | HEART RATE: 73 BPM | HEIGHT: 62 IN

## 2024-02-08 DIAGNOSIS — G35 MULTIPLE SCLEROSIS (HCC): ICD-10-CM

## 2024-02-08 DIAGNOSIS — G43.E09 CHRONIC MIGRAINE WITH AURA WITHOUT STATUS MIGRAINOSUS, NOT INTRACTABLE: ICD-10-CM

## 2024-02-08 PROCEDURE — 3074F SYST BP LT 130 MM HG: CPT | Performed by: PSYCHIATRY & NEUROLOGY

## 2024-02-08 PROCEDURE — 3078F DIAST BP <80 MM HG: CPT | Performed by: PSYCHIATRY & NEUROLOGY

## 2024-02-08 PROCEDURE — 99214 OFFICE O/P EST MOD 30 MIN: CPT | Performed by: PSYCHIATRY & NEUROLOGY

## 2024-02-08 PROCEDURE — 99211 OFF/OP EST MAY X REQ PHY/QHP: CPT | Performed by: PSYCHIATRY & NEUROLOGY

## 2024-02-08 RX ORDER — 0.9 % SODIUM CHLORIDE 0.9 %
3 VIAL (ML) INJECTION PRN
OUTPATIENT
Start: 2024-02-22

## 2024-02-08 RX ORDER — 0.9 % SODIUM CHLORIDE 0.9 %
10 VIAL (ML) INJECTION PRN
OUTPATIENT
Start: 2024-02-22

## 2024-02-08 RX ORDER — EPINEPHRINE 1 MG/ML(1)
0.5 AMPUL (ML) INJECTION PRN
OUTPATIENT
Start: 2024-02-22

## 2024-02-08 RX ORDER — AMITRIPTYLINE HYDROCHLORIDE 25 MG/1
25 TABLET, FILM COATED ORAL NIGHTLY
Qty: 90 TABLET | Refills: 3 | Status: SHIPPED | OUTPATIENT
Start: 2024-02-08 | End: 2025-02-02

## 2024-02-08 RX ORDER — 0.9 % SODIUM CHLORIDE 0.9 %
VIAL (ML) INJECTION PRN
OUTPATIENT
Start: 2024-02-22

## 2024-02-08 RX ORDER — DIPHENHYDRAMINE HYDROCHLORIDE 50 MG/ML
50 INJECTION INTRAMUSCULAR; INTRAVENOUS PRN
OUTPATIENT
Start: 2024-02-22

## 2024-02-08 RX ORDER — METHYLPREDNISOLONE SODIUM SUCCINATE 125 MG/2ML
100 INJECTION, POWDER, LYOPHILIZED, FOR SOLUTION INTRAMUSCULAR; INTRAVENOUS ONCE
OUTPATIENT
Start: 2024-02-22

## 2024-02-08 RX ORDER — SODIUM CHLORIDE 9 MG/ML
INJECTION, SOLUTION INTRAVENOUS CONTINUOUS
OUTPATIENT
Start: 2024-02-22

## 2024-02-08 RX ORDER — ACETAMINOPHEN 325 MG/1
650 TABLET ORAL ONCE
OUTPATIENT
Start: 2024-02-22

## 2024-02-08 RX ORDER — DIPHENHYDRAMINE HCL 25 MG
50 TABLET ORAL ONCE
OUTPATIENT
Start: 2024-02-22 | End: 2024-02-22

## 2024-02-08 RX ORDER — METHYLPREDNISOLONE SODIUM SUCCINATE 125 MG/2ML
125 INJECTION, POWDER, LYOPHILIZED, FOR SOLUTION INTRAMUSCULAR; INTRAVENOUS PRN
OUTPATIENT
Start: 2024-02-22

## 2024-02-08 RX ORDER — ONDANSETRON 2 MG/ML
8 INJECTION INTRAMUSCULAR; INTRAVENOUS EVERY 4 HOURS PRN
OUTPATIENT
Start: 2024-02-22

## 2024-02-08 ASSESSMENT — PATIENT HEALTH QUESTIONNAIRE - PHQ9
CLINICAL INTERPRETATION OF PHQ2 SCORE: 2
5. POOR APPETITE OR OVEREATING: 1 - SEVERAL DAYS
SUM OF ALL RESPONSES TO PHQ QUESTIONS 1-9: 9

## 2024-02-08 NOTE — PROGRESS NOTES
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 no abnormal enhancement...\" (personally reviewed, notable for infratentorial and cortical/juxta-cortical lesions)  - 11/22/2023:   MRI cervical spine:  - 1/12/2024: \"no evidence of cord signal abnormality...\"  MRI thoracic spine:  - 1/12/2024: \"within normal limits...\"  Immunizations:  - influenza?:   - Pneumonia?:  - SARS-CoV-2?:   Cancer Screens:  - mammogram:   - PAP?:   - skin check:     CC: MS    INTERVAL HISTORY:  Windy Funk is a 33 y.o. woman with MS and chronic migraine.  I last saw her in the MS Clinic on 1/30/2024.  At that time I recommended additional blood work to gauge the safety of various immunotherapy options, and I provided her with a list of drugs to consider.  Today, she was unaccomapnied, and she provided the following interval history:    Windy has not yet been able to complete pre-treatment testing.  She has an appointment at the lab next week.    Otherwise, she has not noticed any new or worsened neurologic symptoms.    Otherwise, her migraines continue.  Amitriptyline 20 mg nightly is helpful.    MEDICATIONS:  Current Outpatient Medications   Medication Sig    amitriptyline (ELAVIL) 25 MG Tab Take 1 Tablet by mouth every evening for 360 days.    atorvastatin (LIPITOR) 40 MG Tab Take 1 Tablet by mouth every evening.     MEDICAL, " SOCIAL, AND FAMILY HISTORY:  There is no change in the patient's ROS or medical, social, or family histories since the previous visit on 1/30/2024.    REVIEW OF SYSTEMS:  A ROS was completed.  Pertinent positives and negatives were included in the HPI, above.  All other systems were reviewed and are negative.    PHYSICAL EXAM:  General/Medical:  - NAD    Neuro:  MENTAL STATUS: awake and alert; no deficits of speech or language; oriented to conversation; affect was appropriate to situation; pleasant, cooperative    CRANIAL NERVES:    II: acuity: NT, fields: NT, pupils: NT, discs: NT    III/IV/VI: versions: grossly intact    V: facial sensation: NT    VII: facial expression: symmetric    VIII: hearing: intact to voice    IX/X: palate: NT    XI: shoulder shrug: NT    XII: tongue: NT    MOTOR:  - bulk: NT  - tone: NT  Upper Extremity Strength (R/L)    NT   Elbow flexion NT   Elbow extension NT   Shoulder abduction NT     Lower Extremity Strength  (R/L)   Hip flexion NT   Knee extension NT   Knee flexion NT   Ankle dorsiflexion NT   Ankle plantarflexion NT     - pronator drift: NT  - abnormal movements: none    SENSATION:  - light touch: NT  - vibration (R/L, seconds): NT at the great toes  - pinprick: NT  - proprioception: NT  - Romberg: NT    COORDINATION:  - finger to nose: NT  - finger tapping: NT    REFLEXES:  Reflex Right Left   BR NT NT   Biceps NT NT   Triceps NT NT   Patellae NT NT   Achilles NT NT   Toes NT NT     GAIT:  - NT    REVIEW OF IMAGING STUDIES:  I have summarized pertinent data above.    REVIEW OF LABORATORY STUDIES:  I have summarized pertinent data above.    ASSESSMENT:  Windy Funk is a 33 y.o. woman with MS and chronic migraine.  Plan to start Ocrevus.  She will complete pre-treatment blood work and get the Prevnar 20 vaccine.    PLAN:  MS:  - start Ocrevus  - pre-treatment blood work (already ordered, appointment made with lab)  - Prevnar 20 vaccination    Migraine:  Orders  "Placed This Encounter    amitriptyline (ELAVIL) 25 MG Tab     Follow-Up:  - No follow-ups on file.    Signed: Ozzy Kim M.D.    BILLING DOCUMENTATION:   I spent 34 minutes reviewing the medical record, interviewing and examining the patient, discussing my impression (see \"assessment\" above), and coordinating care.  "

## 2024-02-15 ENCOUNTER — HOSPITAL ENCOUNTER (OUTPATIENT)
Dept: LAB | Facility: MEDICAL CENTER | Age: 34
End: 2024-02-15
Attending: PSYCHIATRY & NEUROLOGY
Payer: COMMERCIAL

## 2024-02-15 ENCOUNTER — APPOINTMENT (OUTPATIENT)
Dept: LAB | Facility: MEDICAL CENTER | Age: 34
End: 2024-02-15
Payer: COMMERCIAL

## 2024-02-15 DIAGNOSIS — G35 MULTIPLE SCLEROSIS (HCC): ICD-10-CM

## 2024-02-15 LAB
25(OH)D3 SERPL-MCNC: 21 NG/ML (ref 30–100)
ALBUMIN SERPL BCP-MCNC: 3.9 G/DL (ref 3.2–4.9)
ALBUMIN/GLOB SERPL: 1.1 G/DL
ALP SERPL-CCNC: 55 U/L (ref 30–99)
ALT SERPL-CCNC: 33 U/L (ref 2–50)
ANION GAP SERPL CALC-SCNC: 14 MMOL/L (ref 7–16)
AST SERPL-CCNC: 35 U/L (ref 12–45)
BASOPHILS # BLD AUTO: 0.8 % (ref 0–1.8)
BASOPHILS # BLD: 0.04 K/UL (ref 0–0.12)
BILIRUB SERPL-MCNC: 0.5 MG/DL (ref 0.1–1.5)
BUN SERPL-MCNC: 9 MG/DL (ref 8–22)
CALCIUM ALBUM COR SERPL-MCNC: 9.4 MG/DL (ref 8.5–10.5)
CALCIUM SERPL-MCNC: 9.3 MG/DL (ref 8.5–10.5)
CHLORIDE SERPL-SCNC: 106 MMOL/L (ref 96–112)
CO2 SERPL-SCNC: 22 MMOL/L (ref 20–33)
CREAT SERPL-MCNC: 0.88 MG/DL (ref 0.5–1.4)
EOSINOPHIL # BLD AUTO: 0.04 K/UL (ref 0–0.51)
EOSINOPHIL NFR BLD: 0.8 % (ref 0–6.9)
ERYTHROCYTE [DISTWIDTH] IN BLOOD BY AUTOMATED COUNT: 39.7 FL (ref 35.9–50)
GFR SERPLBLD CREATININE-BSD FMLA CKD-EPI: 88 ML/MIN/1.73 M 2
GLOBULIN SER CALC-MCNC: 3.7 G/DL (ref 1.9–3.5)
GLUCOSE SERPL-MCNC: 86 MG/DL (ref 65–99)
HBV SURFACE AB SERPL IA-ACNC: 23.8 MIU/ML (ref 0–10)
HBV SURFACE AG SER QL: NORMAL
HCT VFR BLD AUTO: 43.2 % (ref 37–47)
HCV AB SER QL: NORMAL
HGB BLD-MCNC: 15 G/DL (ref 12–16)
HIV 1+2 AB+HIV1 P24 AG SERPL QL IA: NORMAL
IMM GRANULOCYTES # BLD AUTO: 0 K/UL (ref 0–0.11)
IMM GRANULOCYTES NFR BLD AUTO: 0 % (ref 0–0.9)
LYMPHOCYTES # BLD AUTO: 1.84 K/UL (ref 1–4.8)
LYMPHOCYTES NFR BLD: 38.5 % (ref 22–41)
MCH RBC QN AUTO: 31.1 PG (ref 27–33)
MCHC RBC AUTO-ENTMCNC: 34.7 G/DL (ref 32.2–35.5)
MCV RBC AUTO: 89.6 FL (ref 81.4–97.8)
MONOCYTES # BLD AUTO: 0.41 K/UL (ref 0–0.85)
MONOCYTES NFR BLD AUTO: 8.6 % (ref 0–13.4)
NEUTROPHILS # BLD AUTO: 2.45 K/UL (ref 1.82–7.42)
NEUTROPHILS NFR BLD: 51.3 % (ref 44–72)
NRBC # BLD AUTO: 0 K/UL
NRBC BLD-RTO: 0 /100 WBC (ref 0–0.2)
PLATELET # BLD AUTO: 367 K/UL (ref 164–446)
PMV BLD AUTO: 9.9 FL (ref 9–12.9)
POTASSIUM SERPL-SCNC: 4.2 MMOL/L (ref 3.6–5.5)
PROT SERPL-MCNC: 7.6 G/DL (ref 6–8.2)
RBC # BLD AUTO: 4.82 M/UL (ref 4.2–5.4)
SODIUM SERPL-SCNC: 142 MMOL/L (ref 135–145)
TSH SERPL DL<=0.005 MIU/L-ACNC: 2.71 UIU/ML (ref 0.38–5.33)
VIT B12 SERPL-MCNC: 584 PG/ML (ref 211–911)
WBC # BLD AUTO: 4.8 K/UL (ref 4.8–10.8)

## 2024-02-15 PROCEDURE — 86359 T CELLS TOTAL COUNT: CPT

## 2024-02-15 PROCEDURE — 86355 B CELLS TOTAL COUNT: CPT

## 2024-02-15 PROCEDURE — 86803 HEPATITIS C AB TEST: CPT

## 2024-02-15 PROCEDURE — 85025 COMPLETE CBC W/AUTO DIFF WBC: CPT

## 2024-02-15 PROCEDURE — 84443 ASSAY THYROID STIM HORMONE: CPT

## 2024-02-15 PROCEDURE — 82784 ASSAY IGA/IGD/IGG/IGM EACH: CPT

## 2024-02-15 PROCEDURE — 86360 T CELL ABSOLUTE COUNT/RATIO: CPT

## 2024-02-15 PROCEDURE — 82607 VITAMIN B-12: CPT

## 2024-02-15 PROCEDURE — 86235 NUCLEAR ANTIGEN ANTIBODY: CPT

## 2024-02-15 PROCEDURE — 87340 HEPATITIS B SURFACE AG IA: CPT

## 2024-02-15 PROCEDURE — 82306 VITAMIN D 25 HYDROXY: CPT

## 2024-02-15 PROCEDURE — 86706 HEP B SURFACE ANTIBODY: CPT

## 2024-02-15 PROCEDURE — 80053 COMPREHEN METABOLIC PANEL: CPT

## 2024-02-15 PROCEDURE — 87389 HIV-1 AG W/HIV-1&-2 AB AG IA: CPT

## 2024-02-15 PROCEDURE — 86225 DNA ANTIBODY NATIVE: CPT

## 2024-02-15 PROCEDURE — 86039 ANTINUCLEAR ANTIBODIES (ANA): CPT

## 2024-02-15 PROCEDURE — 86357 NK CELLS TOTAL COUNT: CPT

## 2024-02-15 PROCEDURE — 86787 VARICELLA-ZOSTER ANTIBODY: CPT

## 2024-02-15 PROCEDURE — 86038 ANTINUCLEAR ANTIBODIES: CPT

## 2024-02-15 PROCEDURE — 86480 TB TEST CELL IMMUN MEASURE: CPT

## 2024-02-15 PROCEDURE — 36415 COLL VENOUS BLD VENIPUNCTURE: CPT

## 2024-02-16 LAB
GAMMA INTERFERON BACKGROUND BLD IA-ACNC: 0.03 IU/ML
M TB IFN-G BLD-IMP: NEGATIVE
M TB IFN-G CD4+ BCKGRND COR BLD-ACNC: 0 IU/ML
MITOGEN IGNF BCKGRD COR BLD-ACNC: >10 IU/ML
QFT TB2 - NIL TBQ2: 0 IU/ML

## 2024-02-17 LAB
ANNOTATION COMMENT IMP: NORMAL
CD19 CELLS NFR SPEC: 16 % (ref 6–23)
CD3 CELLS # BLD: 1363 CELLS/UL (ref 570–2400)
CD3 CELLS NFR SPEC: 72 % (ref 62–87)
CD3+CD4+ CELLS # BLD: 1005 CELLS/UL (ref 430–1800)
CD3+CD4+ CELLS NFR BLD: 53 % (ref 32–64)
CD3+CD4+ CELLS/CD3+CD8+ CLL BLD: 2.65 RATIO (ref 0.8–3.9)
CD3+CD8+ CELLS # BLD: 371 CELLS/UL (ref 210–1200)
CD3+CD8+ CELLS NFR SPEC: 20 % (ref 15–46)
CD3-CD16+CD56+ CELLS # SPEC: 205 CELLS/UL (ref 78–470)
CD3-CD16+CD56+ CELLS NFR SPEC: 11 % (ref 4–26)
CELLS.CD3-CD19+ [#/VOLUME] IN BLOOD: 298 CELLS/UL (ref 91–610)
DSDNA AB TITR SER CLIF: NORMAL {TITER}
ENA SS-B IGG SER IA-ACNC: 0 AU/ML (ref 0–40)
IGA SERPL-MCNC: 282 MG/DL (ref 68–408)
IGG SERPL-MCNC: 1479 MG/DL (ref 768–1632)
IGM SERPL-MCNC: 261 MG/DL (ref 35–263)
NUCLEAR IGG SER QL IA: DETECTED
SSA52 R0ENA AB IGG Q0420: 1 AU/ML (ref 0–40)
SSA60 R0ENA AB IGG Q0419: 1 AU/ML (ref 0–40)
VZV IGG SER IA-ACNC: 2012 IV

## 2024-02-19 LAB
ANA INTERPRETIVE COMMENT Q5143: ABNORMAL
ANA PATTERN Q5144: ABNORMAL
ANA TITER Q5145: ABNORMAL
ANTINUCLEAR ANTIBODY (ANA), HEP-2, IGG Q5142: DETECTED

## 2024-03-01 ENCOUNTER — TELEPHONE (OUTPATIENT)
Dept: NEUROLOGY | Facility: MEDICAL CENTER | Age: 34
End: 2024-03-01
Payer: COMMERCIAL

## 2024-03-01 NOTE — TELEPHONE ENCOUNTER
Ozzy Kim   Caller: Windy Funk     Topic/issue: patient states she previously dropped paper work off at the  for an approval appeal. She was contacted by the company whom ordered this paperwork and was told it was sent to the incorrect fax number.  Updated fax: [phone]    Callback Number: [phone]     Thank you  Keely POWELL

## 2024-03-12 ENCOUNTER — TELEPHONE (OUTPATIENT)
Dept: NEUROLOGY | Facility: MEDICAL CENTER | Age: 34
End: 2024-03-12
Payer: COMMERCIAL

## 2024-03-12 NOTE — TELEPHONE ENCOUNTER
Pt called stating that she received a letter of denial from [employer] and needs all of her info faxed and for it to say urgent medical need so they can process it. Pt would like a call back.     3/7/24 2:24 pm

## 2024-03-13 NOTE — TELEPHONE ENCOUNTER
Called and spoke with [employer] to try and figure out what the issue is, as I have faxed clinical information over to [employer] and have not gotten any correspondence back, I re-faxed all paperwork again. I have also asked Dr. Kim to write a letter stating medical necessity an will then also fax that over to [employer]. Waiting on letter.

## 2024-04-02 ENCOUNTER — TELEPHONE (OUTPATIENT)
Dept: NEUROLOGY | Facility: MEDICAL CENTER | Age: 34
End: 2024-04-02
Payer: COMMERCIAL

## 2024-04-02 NOTE — TELEPHONE ENCOUNTER
Will be reaching out to [facility] for an appeal update on Monday 4/8/24. When I spoke with them last week they notified me that the appeal was being reviewed and that I must give them 30 days from the day it was submitted which was 3/7/24.

## 2024-04-02 NOTE — TELEPHONE ENCOUNTER
Pt called stating that she received a letter of denial for ocrevus and wants to know the next steps and what she needs to do to get treatment.

## 2024-04-16 ENCOUNTER — OUTPATIENT INFUSION SERVICES (OUTPATIENT)
Dept: ONCOLOGY | Facility: MEDICAL CENTER | Age: 34
End: 2024-04-16
Attending: PSYCHIATRY & NEUROLOGY
Payer: COMMERCIAL

## 2024-04-16 VITALS
RESPIRATION RATE: 18 BRPM | BODY MASS INDEX: 26.49 KG/M2 | OXYGEN SATURATION: 97 % | SYSTOLIC BLOOD PRESSURE: 107 MMHG | HEART RATE: 84 BPM | DIASTOLIC BLOOD PRESSURE: 63 MMHG | TEMPERATURE: 98.3 F | WEIGHT: 143.96 LBS | HEIGHT: 62 IN

## 2024-04-16 DIAGNOSIS — G35 MULTIPLE SCLEROSIS (HCC): ICD-10-CM

## 2024-04-16 LAB
HBV CORE AB SERPL QL IA: NONREACTIVE
HCG UR QL: NEGATIVE

## 2024-04-16 PROCEDURE — 86704 HEP B CORE ANTIBODY TOTAL: CPT

## 2024-04-16 PROCEDURE — A9270 NON-COVERED ITEM OR SERVICE: HCPCS | Mod: UD | Performed by: PSYCHIATRY & NEUROLOGY

## 2024-04-16 PROCEDURE — 96413 CHEMO IV INFUSION 1 HR: CPT

## 2024-04-16 PROCEDURE — 81025 URINE PREGNANCY TEST: CPT

## 2024-04-16 PROCEDURE — 700105 HCHG RX REV CODE 258: Mod: UD | Performed by: PSYCHIATRY & NEUROLOGY

## 2024-04-16 PROCEDURE — 700102 HCHG RX REV CODE 250 W/ 637 OVERRIDE(OP): Mod: UD | Performed by: PSYCHIATRY & NEUROLOGY

## 2024-04-16 PROCEDURE — 700111 HCHG RX REV CODE 636 W/ 250 OVERRIDE (IP): Mod: JZ,UD | Performed by: PSYCHIATRY & NEUROLOGY

## 2024-04-16 PROCEDURE — 96415 CHEMO IV INFUSION ADDL HR: CPT

## 2024-04-16 PROCEDURE — 96375 TX/PRO/DX INJ NEW DRUG ADDON: CPT

## 2024-04-16 RX ORDER — ACETAMINOPHEN 325 MG/1
650 TABLET ORAL ONCE
Status: CANCELLED | OUTPATIENT
Start: 2024-09-23

## 2024-04-16 RX ORDER — METHYLPREDNISOLONE SODIUM SUCCINATE 125 MG/2ML
100 INJECTION, POWDER, LYOPHILIZED, FOR SOLUTION INTRAMUSCULAR; INTRAVENOUS ONCE
Status: CANCELLED | OUTPATIENT
Start: 2024-09-23

## 2024-04-16 RX ORDER — METHYLPREDNISOLONE SODIUM SUCCINATE 125 MG/2ML
125 INJECTION, POWDER, LYOPHILIZED, FOR SOLUTION INTRAMUSCULAR; INTRAVENOUS PRN
Status: CANCELLED | OUTPATIENT
Start: 2024-09-23

## 2024-04-16 RX ORDER — DIPHENHYDRAMINE HCL 25 MG
50 TABLET ORAL ONCE
Status: CANCELLED | OUTPATIENT
Start: 2024-09-23 | End: 2024-09-23

## 2024-04-16 RX ORDER — 0.9 % SODIUM CHLORIDE 0.9 %
VIAL (ML) INJECTION PRN
Status: CANCELLED | OUTPATIENT
Start: 2024-09-23

## 2024-04-16 RX ORDER — 0.9 % SODIUM CHLORIDE 0.9 %
3 VIAL (ML) INJECTION PRN
Status: CANCELLED | OUTPATIENT
Start: 2024-09-23

## 2024-04-16 RX ORDER — DIPHENHYDRAMINE HCL 25 MG
50 TABLET ORAL ONCE
Status: COMPLETED | OUTPATIENT
Start: 2024-04-16 | End: 2024-04-16

## 2024-04-16 RX ORDER — SODIUM CHLORIDE 9 MG/ML
INJECTION, SOLUTION INTRAVENOUS CONTINUOUS
Status: CANCELLED | OUTPATIENT
Start: 2024-09-23

## 2024-04-16 RX ORDER — EPINEPHRINE 1 MG/ML(1)
0.5 AMPUL (ML) INJECTION PRN
Status: CANCELLED | OUTPATIENT
Start: 2024-09-23

## 2024-04-16 RX ORDER — SODIUM CHLORIDE 9 MG/ML
INJECTION, SOLUTION INTRAVENOUS CONTINUOUS
Status: DISCONTINUED | OUTPATIENT
Start: 2024-04-16 | End: 2024-04-16

## 2024-04-16 RX ORDER — DIPHENHYDRAMINE HYDROCHLORIDE 50 MG/ML
50 INJECTION INTRAMUSCULAR; INTRAVENOUS PRN
Status: CANCELLED | OUTPATIENT
Start: 2024-09-23

## 2024-04-16 RX ORDER — METHYLPREDNISOLONE SODIUM SUCCINATE 125 MG/2ML
100 INJECTION, POWDER, LYOPHILIZED, FOR SOLUTION INTRAMUSCULAR; INTRAVENOUS ONCE
Status: COMPLETED | OUTPATIENT
Start: 2024-04-16 | End: 2024-04-16

## 2024-04-16 RX ORDER — ONDANSETRON 2 MG/ML
8 INJECTION INTRAMUSCULAR; INTRAVENOUS EVERY 4 HOURS PRN
Status: CANCELLED | OUTPATIENT
Start: 2024-09-23

## 2024-04-16 RX ORDER — 0.9 % SODIUM CHLORIDE 0.9 %
10 VIAL (ML) INJECTION PRN
Status: CANCELLED | OUTPATIENT
Start: 2024-09-23

## 2024-04-16 RX ORDER — ACETAMINOPHEN 325 MG/1
650 TABLET ORAL ONCE
Status: COMPLETED | OUTPATIENT
Start: 2024-04-16 | End: 2024-04-16

## 2024-04-16 RX ADMIN — DIPHENHYDRAMINE HYDROCHLORIDE 50 MG: 25 TABLET ORAL at 10:30

## 2024-04-16 RX ADMIN — ACETAMINOPHEN 650 MG: 325 TABLET ORAL at 10:30

## 2024-04-16 RX ADMIN — METHYLPREDNISOLONE SODIUM SUCCINATE 100 MG: 125 INJECTION, POWDER, FOR SOLUTION INTRAMUSCULAR; INTRAVENOUS at 10:31

## 2024-04-16 RX ADMIN — OCRELIZUMAB 300 MG: 300 INJECTION INTRAVENOUS at 10:51

## 2024-04-16 ASSESSMENT — FIBROSIS 4 INDEX: FIB4 SCORE: 0.56

## 2024-04-16 NOTE — PROGRESS NOTES
Patient arrived to unit for Ocrevus. This is her first dose. She complains of right-sided weakness due to a history of CVA. Otherwise, no other complaints. VSS. Verified patient has not had any infections or live vaccines in the past 4 weeks.    22G IV established in the right AC. Blood return noted. Flushed with 10 mL NS. No erythema, edema, or pain noted.    Premeds administered.    Ocrevus administered per titration protocol with a max rate of 180 mL/h. Patient observed for 1 hour.    Patient denies any adverse effects after 1 hour observation. Future appointments confirmed. Patient discharged home in stable condition.

## 2024-04-30 ENCOUNTER — OUTPATIENT INFUSION SERVICES (OUTPATIENT)
Dept: ONCOLOGY | Facility: MEDICAL CENTER | Age: 34
End: 2024-04-30
Attending: PSYCHIATRY & NEUROLOGY
Payer: COMMERCIAL

## 2024-04-30 VITALS
SYSTOLIC BLOOD PRESSURE: 100 MMHG | OXYGEN SATURATION: 97 % | BODY MASS INDEX: 26.57 KG/M2 | HEART RATE: 82 BPM | HEIGHT: 62 IN | WEIGHT: 144.4 LBS | DIASTOLIC BLOOD PRESSURE: 64 MMHG | TEMPERATURE: 97.1 F | RESPIRATION RATE: 16 BRPM

## 2024-04-30 DIAGNOSIS — G35 MULTIPLE SCLEROSIS (HCC): ICD-10-CM

## 2024-04-30 PROCEDURE — 96413 CHEMO IV INFUSION 1 HR: CPT

## 2024-04-30 PROCEDURE — 700102 HCHG RX REV CODE 250 W/ 637 OVERRIDE(OP): Mod: UD | Performed by: PSYCHIATRY & NEUROLOGY

## 2024-04-30 PROCEDURE — 96375 TX/PRO/DX INJ NEW DRUG ADDON: CPT

## 2024-04-30 PROCEDURE — 700111 HCHG RX REV CODE 636 W/ 250 OVERRIDE (IP): Mod: JZ,UD | Performed by: PSYCHIATRY & NEUROLOGY

## 2024-04-30 PROCEDURE — 700105 HCHG RX REV CODE 258: Mod: UD | Performed by: PSYCHIATRY & NEUROLOGY

## 2024-04-30 PROCEDURE — 96415 CHEMO IV INFUSION ADDL HR: CPT

## 2024-04-30 PROCEDURE — 96376 TX/PRO/DX INJ SAME DRUG ADON: CPT

## 2024-04-30 PROCEDURE — A9270 NON-COVERED ITEM OR SERVICE: HCPCS | Mod: UD | Performed by: PSYCHIATRY & NEUROLOGY

## 2024-04-30 RX ORDER — DIPHENHYDRAMINE HYDROCHLORIDE 50 MG/ML
50 INJECTION INTRAMUSCULAR; INTRAVENOUS PRN
OUTPATIENT
Start: 2024-10-08

## 2024-04-30 RX ORDER — 0.9 % SODIUM CHLORIDE 0.9 %
10 VIAL (ML) INJECTION PRN
OUTPATIENT
Start: 2024-10-08

## 2024-04-30 RX ORDER — METHYLPREDNISOLONE SODIUM SUCCINATE 125 MG/2ML
125 INJECTION, POWDER, LYOPHILIZED, FOR SOLUTION INTRAMUSCULAR; INTRAVENOUS PRN
OUTPATIENT
Start: 2024-10-08

## 2024-04-30 RX ORDER — DIPHENHYDRAMINE HCL 25 MG
50 TABLET ORAL ONCE
OUTPATIENT
Start: 2024-10-08 | End: 2024-10-08

## 2024-04-30 RX ORDER — 0.9 % SODIUM CHLORIDE 0.9 %
VIAL (ML) INJECTION PRN
OUTPATIENT
Start: 2024-10-08

## 2024-04-30 RX ORDER — ACETAMINOPHEN 325 MG/1
650 TABLET ORAL ONCE
Status: COMPLETED | OUTPATIENT
Start: 2024-04-30 | End: 2024-04-30

## 2024-04-30 RX ORDER — SODIUM CHLORIDE 9 MG/ML
INJECTION, SOLUTION INTRAVENOUS CONTINUOUS
Status: DISCONTINUED | OUTPATIENT
Start: 2024-04-30 | End: 2024-04-30 | Stop reason: HOSPADM

## 2024-04-30 RX ORDER — ACETAMINOPHEN 325 MG/1
650 TABLET ORAL ONCE
OUTPATIENT
Start: 2024-10-08

## 2024-04-30 RX ORDER — EPINEPHRINE 1 MG/ML(1)
0.5 AMPUL (ML) INJECTION PRN
Status: DISCONTINUED | OUTPATIENT
Start: 2024-04-30 | End: 2024-04-30 | Stop reason: HOSPADM

## 2024-04-30 RX ORDER — DIPHENHYDRAMINE HCL 25 MG
50 TABLET ORAL ONCE
Status: COMPLETED | OUTPATIENT
Start: 2024-04-30 | End: 2024-04-30

## 2024-04-30 RX ORDER — ONDANSETRON 2 MG/ML
8 INJECTION INTRAMUSCULAR; INTRAVENOUS EVERY 4 HOURS PRN
OUTPATIENT
Start: 2024-10-08

## 2024-04-30 RX ORDER — METHYLPREDNISOLONE SODIUM SUCCINATE 125 MG/2ML
100 INJECTION, POWDER, LYOPHILIZED, FOR SOLUTION INTRAMUSCULAR; INTRAVENOUS ONCE
OUTPATIENT
Start: 2024-10-08

## 2024-04-30 RX ORDER — DIPHENHYDRAMINE HYDROCHLORIDE 50 MG/ML
50 INJECTION INTRAMUSCULAR; INTRAVENOUS PRN
Status: DISCONTINUED | OUTPATIENT
Start: 2024-04-30 | End: 2024-04-30 | Stop reason: HOSPADM

## 2024-04-30 RX ORDER — METHYLPREDNISOLONE SODIUM SUCCINATE 125 MG/2ML
125 INJECTION, POWDER, LYOPHILIZED, FOR SOLUTION INTRAMUSCULAR; INTRAVENOUS PRN
Status: COMPLETED | OUTPATIENT
Start: 2024-04-30 | End: 2024-04-30

## 2024-04-30 RX ORDER — METHYLPREDNISOLONE SODIUM SUCCINATE 125 MG/2ML
100 INJECTION, POWDER, LYOPHILIZED, FOR SOLUTION INTRAMUSCULAR; INTRAVENOUS ONCE
Status: COMPLETED | OUTPATIENT
Start: 2024-04-30 | End: 2024-04-30

## 2024-04-30 RX ORDER — 0.9 % SODIUM CHLORIDE 0.9 %
3 VIAL (ML) INJECTION PRN
OUTPATIENT
Start: 2024-10-08

## 2024-04-30 RX ORDER — SODIUM CHLORIDE 9 MG/ML
INJECTION, SOLUTION INTRAVENOUS CONTINUOUS
OUTPATIENT
Start: 2024-10-08

## 2024-04-30 RX ORDER — EPINEPHRINE 1 MG/ML(1)
0.5 AMPUL (ML) INJECTION PRN
OUTPATIENT
Start: 2024-10-08

## 2024-04-30 RX ADMIN — SODIUM CHLORIDE 300 MG: 9 INJECTION, SOLUTION INTRAVENOUS at 11:55

## 2024-04-30 RX ADMIN — DIPHENHYDRAMINE HYDROCHLORIDE 50 MG: 25 TABLET ORAL at 11:31

## 2024-04-30 RX ADMIN — SODIUM CHLORIDE: 9 INJECTION, SOLUTION INTRAVENOUS at 16:15

## 2024-04-30 RX ADMIN — ACETAMINOPHEN 650 MG: 325 TABLET ORAL at 11:31

## 2024-04-30 RX ADMIN — METHYLPREDNISOLONE SODIUM SUCCINATE 125 MG: 125 INJECTION, POWDER, FOR SOLUTION INTRAMUSCULAR; INTRAVENOUS at 15:50

## 2024-04-30 RX ADMIN — METHYLPREDNISOLONE SODIUM SUCCINATE 100 MG: 125 INJECTION, POWDER, FOR SOLUTION INTRAMUSCULAR; INTRAVENOUS at 11:35

## 2024-04-30 ASSESSMENT — FIBROSIS 4 INDEX: FIB4 SCORE: 0.56

## 2024-04-30 NOTE — PROGRESS NOTES
"Windy presented to [facility] for her second infusion of Ocrevus. POC discussed, including estimated treatment time and hour of observation, pt agreeable. Pt denies any current s/s of infection or open wounds/sores. Pt did report that 2 days after her first infusion she had chest tightness. She stated she reached out to her provider and he recommended she take Claritan. PIV started to right AC, brisk blood return observed and flushed easily. Pre-meds given as ordered. Ocrevus infused and titrated per pharmacy instructions to max rate of 180 ml/hr. Pt did state she felt dizzy at this time but no other adverse reactions. 1 hour obs completed. At this time the patient c/o \"not feeling right\" and \"some chest tightness\". VS taken and WNL. PRN dose of Solumedrol given. After 20 minutes, pt ambulated with assistance to the bathroom. Pt stated she still felt dizzy but better. PIV flushed and removed, gauze and Coban dressing placed. Schedulers emailed about the next appointment. Pt discharged to home with her father.  "

## 2024-07-11 ENCOUNTER — OFFICE VISIT (OUTPATIENT)
Dept: NEUROLOGY | Facility: MEDICAL CENTER | Age: 34
End: 2024-07-11
Attending: PSYCHIATRY & NEUROLOGY
Payer: COMMERCIAL

## 2024-07-11 VITALS
TEMPERATURE: 98.7 F | HEART RATE: 82 BPM | HEIGHT: 62 IN | DIASTOLIC BLOOD PRESSURE: 60 MMHG | BODY MASS INDEX: 26.45 KG/M2 | SYSTOLIC BLOOD PRESSURE: 104 MMHG | OXYGEN SATURATION: 98 % | WEIGHT: 143.74 LBS | RESPIRATION RATE: 16 BRPM

## 2024-07-11 DIAGNOSIS — G43.E09 CHRONIC MIGRAINE WITH AURA WITHOUT STATUS MIGRAINOSUS, NOT INTRACTABLE: Primary | ICD-10-CM

## 2024-07-11 DIAGNOSIS — G35 MULTIPLE SCLEROSIS (HCC): ICD-10-CM

## 2024-07-11 PROCEDURE — 3074F SYST BP LT 130 MM HG: CPT | Performed by: PSYCHIATRY & NEUROLOGY

## 2024-07-11 PROCEDURE — 99211 OFF/OP EST MAY X REQ PHY/QHP: CPT | Performed by: PSYCHIATRY & NEUROLOGY

## 2024-07-11 PROCEDURE — 3078F DIAST BP <80 MM HG: CPT | Performed by: PSYCHIATRY & NEUROLOGY

## 2024-07-11 PROCEDURE — 99215 OFFICE O/P EST HI 40 MIN: CPT | Performed by: PSYCHIATRY & NEUROLOGY

## 2024-07-11 RX ORDER — TOPIRAMATE 25 MG/1
50 TABLET ORAL DAILY
Qty: 60 TABLET | Refills: 11 | Status: SHIPPED | OUTPATIENT
Start: 2024-07-11 | End: 2025-07-06

## 2024-07-11 RX ORDER — SUMATRIPTAN 100 MG/1
TABLET, FILM COATED ORAL
Qty: 12 TABLET | Refills: 11 | Status: SHIPPED | OUTPATIENT
Start: 2024-07-11

## 2024-07-11 ASSESSMENT — FIBROSIS 4 INDEX: FIB4 SCORE: 0.56

## 2024-10-23 ENCOUNTER — OFFICE VISIT (OUTPATIENT)
Dept: NEUROLOGY | Facility: MEDICAL CENTER | Age: 34
End: 2024-10-23
Attending: PSYCHIATRY & NEUROLOGY
Payer: COMMERCIAL

## 2024-10-23 VITALS
TEMPERATURE: 97.1 F | HEART RATE: 71 BPM | WEIGHT: 149 LBS | BODY MASS INDEX: 27.42 KG/M2 | HEIGHT: 62 IN | RESPIRATION RATE: 16 BRPM | SYSTOLIC BLOOD PRESSURE: 102 MMHG | OXYGEN SATURATION: 98 % | DIASTOLIC BLOOD PRESSURE: 76 MMHG

## 2024-10-23 DIAGNOSIS — G43.E09 CHRONIC MIGRAINE WITH AURA WITHOUT STATUS MIGRAINOSUS, NOT INTRACTABLE: ICD-10-CM

## 2024-10-23 DIAGNOSIS — G35 MULTIPLE SCLEROSIS (HCC): ICD-10-CM

## 2024-10-23 PROCEDURE — 3078F DIAST BP <80 MM HG: CPT | Performed by: PSYCHIATRY & NEUROLOGY

## 2024-10-23 PROCEDURE — 99215 OFFICE O/P EST HI 40 MIN: CPT | Performed by: PSYCHIATRY & NEUROLOGY

## 2024-10-23 PROCEDURE — 99212 OFFICE O/P EST SF 10 MIN: CPT | Performed by: PSYCHIATRY & NEUROLOGY

## 2024-10-23 PROCEDURE — 3074F SYST BP LT 130 MM HG: CPT | Performed by: PSYCHIATRY & NEUROLOGY

## 2024-10-23 ASSESSMENT — PATIENT HEALTH QUESTIONNAIRE - PHQ9: CLINICAL INTERPRETATION OF PHQ2 SCORE: 0

## 2024-10-23 ASSESSMENT — FIBROSIS 4 INDEX: FIB4 SCORE: 0.58

## 2024-10-24 ENCOUNTER — APPOINTMENT (OUTPATIENT)
Dept: NEUROLOGY | Facility: MEDICAL CENTER | Age: 34
End: 2024-10-24
Attending: PSYCHIATRY & NEUROLOGY
Payer: COMMERCIAL

## 2024-11-05 ENCOUNTER — OUTPATIENT INFUSION SERVICES (OUTPATIENT)
Dept: ONCOLOGY | Facility: MEDICAL CENTER | Age: 34
End: 2024-11-05
Attending: PSYCHIATRY & NEUROLOGY
Payer: COMMERCIAL

## 2024-11-05 VITALS
DIASTOLIC BLOOD PRESSURE: 84 MMHG | HEIGHT: 60 IN | SYSTOLIC BLOOD PRESSURE: 131 MMHG | HEART RATE: 74 BPM | RESPIRATION RATE: 20 BRPM | TEMPERATURE: 98 F | BODY MASS INDEX: 28.57 KG/M2 | WEIGHT: 145.5 LBS | OXYGEN SATURATION: 99 %

## 2024-11-05 DIAGNOSIS — G35 MULTIPLE SCLEROSIS (HCC): ICD-10-CM

## 2024-11-05 PROCEDURE — 96365 THER/PROPH/DIAG IV INF INIT: CPT

## 2024-11-05 PROCEDURE — A9270 NON-COVERED ITEM OR SERVICE: HCPCS | Mod: UD | Performed by: PSYCHIATRY & NEUROLOGY

## 2024-11-05 PROCEDURE — 700105 HCHG RX REV CODE 258: Mod: UD | Performed by: PSYCHIATRY & NEUROLOGY

## 2024-11-05 PROCEDURE — 700102 HCHG RX REV CODE 250 W/ 637 OVERRIDE(OP): Mod: UD | Performed by: PSYCHIATRY & NEUROLOGY

## 2024-11-05 PROCEDURE — 96376 TX/PRO/DX INJ SAME DRUG ADON: CPT

## 2024-11-05 PROCEDURE — 96375 TX/PRO/DX INJ NEW DRUG ADDON: CPT

## 2024-11-05 PROCEDURE — 96366 THER/PROPH/DIAG IV INF ADDON: CPT

## 2024-11-05 PROCEDURE — 700111 HCHG RX REV CODE 636 W/ 250 OVERRIDE (IP): Mod: JZ,UD | Performed by: PSYCHIATRY & NEUROLOGY

## 2024-11-05 RX ORDER — METHYLPREDNISOLONE SODIUM SUCCINATE 125 MG/2ML
125 INJECTION, POWDER, LYOPHILIZED, FOR SOLUTION INTRAMUSCULAR; INTRAVENOUS PRN
Status: COMPLETED | OUTPATIENT
Start: 2024-11-05 | End: 2024-11-05

## 2024-11-05 RX ORDER — ACETAMINOPHEN 325 MG/1
650 TABLET ORAL ONCE
Status: COMPLETED | OUTPATIENT
Start: 2024-11-05 | End: 2024-11-05

## 2024-11-05 RX ORDER — DIPHENHYDRAMINE HYDROCHLORIDE 50 MG/ML
50 INJECTION INTRAMUSCULAR; INTRAVENOUS PRN
Status: COMPLETED | OUTPATIENT
Start: 2024-11-05 | End: 2024-11-05

## 2024-11-05 RX ORDER — EPINEPHRINE 1 MG/ML(1)
0.5 AMPUL (ML) INJECTION PRN
OUTPATIENT
Start: 2025-03-24

## 2024-11-05 RX ORDER — SODIUM CHLORIDE 9 MG/ML
INJECTION, SOLUTION INTRAVENOUS CONTINUOUS
OUTPATIENT
Start: 2025-03-24

## 2024-11-05 RX ORDER — 0.9 % SODIUM CHLORIDE 0.9 %
10 VIAL (ML) INJECTION PRN
OUTPATIENT
Start: 2025-03-24

## 2024-11-05 RX ORDER — EPINEPHRINE 1 MG/ML(1)
0.5 AMPUL (ML) INJECTION PRN
Status: DISCONTINUED | OUTPATIENT
Start: 2024-11-05 | End: 2024-11-05 | Stop reason: HOSPADM

## 2024-11-05 RX ORDER — METHYLPREDNISOLONE SODIUM SUCCINATE 125 MG/2ML
100 INJECTION, POWDER, LYOPHILIZED, FOR SOLUTION INTRAMUSCULAR; INTRAVENOUS ONCE
OUTPATIENT
Start: 2025-03-24

## 2024-11-05 RX ORDER — METHYLPREDNISOLONE SODIUM SUCCINATE 125 MG/2ML
100 INJECTION, POWDER, LYOPHILIZED, FOR SOLUTION INTRAMUSCULAR; INTRAVENOUS ONCE
Status: COMPLETED | OUTPATIENT
Start: 2024-11-05 | End: 2024-11-05

## 2024-11-05 RX ORDER — 0.9 % SODIUM CHLORIDE 0.9 %
3 VIAL (ML) INJECTION PRN
OUTPATIENT
Start: 2025-03-24

## 2024-11-05 RX ORDER — SODIUM CHLORIDE 9 MG/ML
INJECTION, SOLUTION INTRAVENOUS CONTINUOUS
Status: DISCONTINUED | OUTPATIENT
Start: 2024-11-05 | End: 2024-11-05 | Stop reason: HOSPADM

## 2024-11-05 RX ORDER — DIPHENHYDRAMINE HCL 25 MG
50 TABLET ORAL ONCE
OUTPATIENT
Start: 2025-03-24 | End: 2025-03-24

## 2024-11-05 RX ORDER — 0.9 % SODIUM CHLORIDE 0.9 %
VIAL (ML) INJECTION PRN
OUTPATIENT
Start: 2025-03-24

## 2024-11-05 RX ORDER — ACETAMINOPHEN 325 MG/1
650 TABLET ORAL ONCE
OUTPATIENT
Start: 2025-03-24

## 2024-11-05 RX ORDER — ONDANSETRON 2 MG/ML
8 INJECTION INTRAMUSCULAR; INTRAVENOUS EVERY 4 HOURS PRN
OUTPATIENT
Start: 2025-03-24

## 2024-11-05 RX ORDER — DIPHENHYDRAMINE HCL 25 MG
50 TABLET ORAL ONCE
Status: DISCONTINUED | OUTPATIENT
Start: 2024-11-05 | End: 2024-11-05 | Stop reason: HOSPADM

## 2024-11-05 RX ORDER — DIPHENHYDRAMINE HYDROCHLORIDE 50 MG/ML
50 INJECTION INTRAMUSCULAR; INTRAVENOUS PRN
OUTPATIENT
Start: 2025-03-24

## 2024-11-05 RX ORDER — METHYLPREDNISOLONE SODIUM SUCCINATE 125 MG/2ML
125 INJECTION, POWDER, LYOPHILIZED, FOR SOLUTION INTRAMUSCULAR; INTRAVENOUS PRN
OUTPATIENT
Start: 2025-03-24

## 2024-11-05 RX ADMIN — ACETAMINOPHEN 650 MG: 325 TABLET ORAL at 11:19

## 2024-11-05 RX ADMIN — OCRELIZUMAB 600 MG: 300 INJECTION INTRAVENOUS at 11:32

## 2024-11-05 RX ADMIN — DIPHENHYDRAMINE HYDROCHLORIDE 25 MG: 50 INJECTION, SOLUTION INTRAMUSCULAR; INTRAVENOUS at 12:39

## 2024-11-05 RX ADMIN — METHYLPREDNISOLONE SODIUM SUCCINATE 125 MG: 125 INJECTION, POWDER, FOR SOLUTION INTRAMUSCULAR; INTRAVENOUS at 12:36

## 2024-11-05 RX ADMIN — METHYLPREDNISOLONE SODIUM SUCCINATE 100 MG: 125 INJECTION, POWDER, FOR SOLUTION INTRAMUSCULAR; INTRAVENOUS at 11:19

## 2024-11-05 ASSESSMENT — FIBROSIS 4 INDEX: FIB4 SCORE: 0.58

## 2024-11-05 NOTE — PROGRESS NOTES
Pt presented to infusion center for Ocrevus. POC discussed, including estimated treatment time and hour of observation, pt agreeable. Pt denies any current s/s of infection or open wounds/sores. PIV started, brisk blood return observed. Pre-meds given as ordered except for benadryl as patient refused due to potential reaction to it in the past. Ocrevus infused and titrated per pharmacy instructions. Patient was titrated and reported symptoms of reaction after receiving 184 mL. Infusion was paused and rescue medications were given (See eMAR). Hydration was initiated. Patient reported improvement of symptoms. Infusion was restarted at 125 mL/hr and was maintained at this rate for the remainder of the infusion. Pt reports that she had a mild reaction the second infusion and that this reaction was significantly worse. Pt tolerated well without worsening or new symptoms. 1 hour obs completed with no s/s of adverse reaction. PIV flushed and removed, gauze and coban dressing placed. Pt has next appt, left on foot in good spirits.

## 2024-11-20 ENCOUNTER — OFFICE VISIT (OUTPATIENT)
Dept: NEUROLOGY | Facility: MEDICAL CENTER | Age: 34
End: 2024-11-20
Attending: PSYCHIATRY & NEUROLOGY
Payer: COMMERCIAL

## 2024-11-20 VITALS
WEIGHT: 149.91 LBS | DIASTOLIC BLOOD PRESSURE: 80 MMHG | SYSTOLIC BLOOD PRESSURE: 120 MMHG | HEIGHT: 62 IN | BODY MASS INDEX: 27.59 KG/M2 | RESPIRATION RATE: 20 BRPM | OXYGEN SATURATION: 97 % | TEMPERATURE: 98.6 F | HEART RATE: 77 BPM

## 2024-11-20 DIAGNOSIS — G43.E09 CHRONIC MIGRAINE WITH AURA WITHOUT STATUS MIGRAINOSUS, NOT INTRACTABLE: Primary | ICD-10-CM

## 2024-11-20 PROCEDURE — 700111 HCHG RX REV CODE 636 W/ 250 OVERRIDE (IP): Mod: JZ,UD

## 2024-11-20 PROCEDURE — 3079F DIAST BP 80-89 MM HG: CPT | Performed by: PSYCHIATRY & NEUROLOGY

## 2024-11-20 PROCEDURE — 3074F SYST BP LT 130 MM HG: CPT | Performed by: PSYCHIATRY & NEUROLOGY

## 2024-11-20 PROCEDURE — 700101 HCHG RX REV CODE 250: Mod: UD

## 2024-11-20 PROCEDURE — 64615 CHEMODENERV MUSC MIGRAINE: CPT | Performed by: PSYCHIATRY & NEUROLOGY

## 2024-11-20 RX ADMIN — SODIUM CHLORIDE 155 UNITS: 9 INJECTION, SOLUTION INTRAMUSCULAR; INTRAVENOUS; SUBCUTANEOUS at 09:44

## 2024-11-20 ASSESSMENT — FIBROSIS 4 INDEX: FIB4 SCORE: 0.58

## 2024-11-20 ASSESSMENT — PATIENT HEALTH QUESTIONNAIRE - PHQ9
5. POOR APPETITE OR OVEREATING: 2 - MORE THAN HALF THE DAYS
CLINICAL INTERPRETATION OF PHQ2 SCORE: 1
SUM OF ALL RESPONSES TO PHQ QUESTIONS 1-9: 9

## 2024-11-20 NOTE — PROGRESS NOTES
[facility]  BOTOX PROCEDURE NOTE    Chronic Migraine:  Botox therapy has reduced patient’s migraines by more than 7 days and/or 100 hours per month.     I treated Windy Funk in clinic today with BotoxA 155 units according to the dosing/injection paradigm currently mandated by the FDA for the management of chronic migraine.  Specifically, I injected:  - 5 units to the procerus,  - 5 units to the corrugators (bilaterally),  - a total of 20 units to the frontalis musculature,  - 20 units to the temporalis (bilaterally),  - 15 units to the occipitalis (bilaterally),  - 10 units to the cervical paraspinals (bilaterally), and  - 15 units to the trapezius musculature (bilaterally).    The remainder of the Botox was discarded as wastage per FDA guidelines  Consent on file.  Patient identify verified with 2 patient identifiers.     Frequency of headaches is >15 days monthly with at least 8 migraines monthly.    Migraines include at least two of the following: worsened with activity or avoidance of activity with migraines (ie they go lie down), moderate to severe pain intensity, pulsing headache, unilateral headache and has  have either nausea or vomiting OR sensitivity to light and sound.     Although Windy Funk is responding to botox s/he is NOT migraine free.  I recommend that Botox be continued at this time.    Windy Funk has chronic migraines, defined as having 15 or more headaches days per month, 8 of which are migraines, over a minimum of the last three months.  Episodes last more than 4 hours (untreated).  Pt has 2 or more of following (see initial note):  - headache worsened with activity  - pain is moderate to severe intensity  - pulsing in nature  - unilateral   and patient either has nausea/vomiting OR sensitivity to light and sound.    No adverse effect of Botox noted at conclusion of today's appointment.    Follow up in 12 weeks for Botox or sooner if  needed.    Signed: Ozzy Kim M.D.

## 2025-02-12 ENCOUNTER — OFFICE VISIT (OUTPATIENT)
Dept: NEUROLOGY | Facility: MEDICAL CENTER | Age: 35
End: 2025-02-12
Attending: PSYCHIATRY & NEUROLOGY
Payer: COMMERCIAL

## 2025-02-12 VITALS
RESPIRATION RATE: 18 BRPM | WEIGHT: 153.22 LBS | SYSTOLIC BLOOD PRESSURE: 108 MMHG | BODY MASS INDEX: 28.2 KG/M2 | DIASTOLIC BLOOD PRESSURE: 70 MMHG | TEMPERATURE: 98.5 F | OXYGEN SATURATION: 100 % | HEART RATE: 68 BPM | HEIGHT: 62 IN

## 2025-02-12 DIAGNOSIS — G43.E09 CHRONIC MIGRAINE WITH AURA WITHOUT STATUS MIGRAINOSUS, NOT INTRACTABLE: ICD-10-CM

## 2025-02-12 PROCEDURE — 64615 CHEMODENERV MUSC MIGRAINE: CPT | Performed by: PSYCHIATRY & NEUROLOGY

## 2025-02-12 PROCEDURE — 3074F SYST BP LT 130 MM HG: CPT | Performed by: PSYCHIATRY & NEUROLOGY

## 2025-02-12 PROCEDURE — 3078F DIAST BP <80 MM HG: CPT | Performed by: PSYCHIATRY & NEUROLOGY

## 2025-02-12 PROCEDURE — 700111 HCHG RX REV CODE 636 W/ 250 OVERRIDE (IP): Mod: JZ,UD

## 2025-02-12 PROCEDURE — 700101 HCHG RX REV CODE 250: Mod: UD

## 2025-02-12 RX ADMIN — SODIUM CHLORIDE 155 UNITS: 9 INJECTION, SOLUTION INTRAMUSCULAR; INTRAVENOUS; SUBCUTANEOUS at 10:54

## 2025-02-12 ASSESSMENT — PATIENT HEALTH QUESTIONNAIRE - PHQ9
CLINICAL INTERPRETATION OF PHQ2 SCORE: 1
SUM OF ALL RESPONSES TO PHQ QUESTIONS 1-9: 10
5. POOR APPETITE OR OVEREATING: 2 - MORE THAN HALF THE DAYS

## 2025-02-12 ASSESSMENT — FIBROSIS 4 INDEX: FIB4 SCORE: 0.58

## 2025-02-26 ENCOUNTER — APPOINTMENT (OUTPATIENT)
Dept: NEUROLOGY | Facility: MEDICAL CENTER | Age: 35
End: 2025-02-26
Attending: PSYCHIATRY & NEUROLOGY
Payer: COMMERCIAL

## 2025-05-05 ENCOUNTER — OFFICE VISIT (OUTPATIENT)
Dept: NEUROLOGY | Facility: MEDICAL CENTER | Age: 35
End: 2025-05-05
Attending: PSYCHIATRY & NEUROLOGY
Payer: COMMERCIAL

## 2025-05-05 VITALS
WEIGHT: 153.44 LBS | BODY MASS INDEX: 28.24 KG/M2 | HEART RATE: 83 BPM | SYSTOLIC BLOOD PRESSURE: 118 MMHG | DIASTOLIC BLOOD PRESSURE: 72 MMHG | RESPIRATION RATE: 14 BRPM | OXYGEN SATURATION: 98 % | HEIGHT: 62 IN | TEMPERATURE: 99.1 F

## 2025-05-05 DIAGNOSIS — G35 MULTIPLE SCLEROSIS (HCC): ICD-10-CM

## 2025-05-05 DIAGNOSIS — G43.E09 CHRONIC MIGRAINE WITH AURA WITHOUT STATUS MIGRAINOSUS, NOT INTRACTABLE: Primary | ICD-10-CM

## 2025-05-05 PROCEDURE — 99215 OFFICE O/P EST HI 40 MIN: CPT | Performed by: PSYCHIATRY & NEUROLOGY

## 2025-05-05 PROCEDURE — 99214 OFFICE O/P EST MOD 30 MIN: CPT | Performed by: PSYCHIATRY & NEUROLOGY

## 2025-05-05 PROCEDURE — 3074F SYST BP LT 130 MM HG: CPT | Performed by: PSYCHIATRY & NEUROLOGY

## 2025-05-05 PROCEDURE — 3078F DIAST BP <80 MM HG: CPT | Performed by: PSYCHIATRY & NEUROLOGY

## 2025-05-05 RX ORDER — ELETRIPTAN HYDROBROMIDE 40 MG/1
TABLET, FILM COATED ORAL
Qty: 12 TABLET | Refills: 11 | Status: SHIPPED
Start: 2025-05-05 | End: 2025-05-23

## 2025-05-05 RX ORDER — ACETAMINOPHEN 325 MG/1
650 TABLET ORAL EVERY 4 HOURS PRN
COMMUNITY

## 2025-05-05 ASSESSMENT — PATIENT HEALTH QUESTIONNAIRE - PHQ9
CLINICAL INTERPRETATION OF PHQ2 SCORE: 2
5. POOR APPETITE OR OVEREATING: 1 - SEVERAL DAYS
SUM OF ALL RESPONSES TO PHQ QUESTIONS 1-9: 11

## 2025-05-05 ASSESSMENT — FIBROSIS 4 INDEX: FIB4 SCORE: 0.58

## 2025-05-05 NOTE — PROGRESS NOTES
"[facility] NEUROLOGY  MULTIPLE SCLEROSIS & NEUROIMMUNOLOGY  FOLLOW-UP VISIT    DISEASE SUMMARY:  Principal neurologic diagnosis: MS  Diagnosis of MS: 1/22/2024  Disease History:  - 3/4/2023: onset of numbness and later weakness involving the right hand, [place] 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 no abnormal enhancement...\" (personally reviewed, notable for infratentorial and cortical/juxta-cortical lesions)  - 11/22/2023:   MRI cervical spine:  - 1/12/2024: \"no evidence of cord signal abnormality...\"  MRI thoracic spine:  - 1/12/2024: \"within normal limits...\"  Immunizations:  - influenza?:   - Pneumonia?:  - SARS-CoV-2?:   Cancer Screens:  - mammogram:   - PAP?:   - skin check:     CC: MS    INTERVAL HISTORY:  Windy Funk is a 34 y.o. woman with MS and chronic migraine.  I last saw her in the MS Clinic on 2/12/2025 at the time of Botox administration.  At that time we planned to continue Ocrevus and try topiramate.  Today, she was unaccomapnied, and she provided the following interval history:    MS:  I have summarized pertinent interval data above.    Chronic Migraine:  The following is a summary of headache symptoms, presented in my standard format:     Family History: none  Age at onset (years): 32 (w/ stroke)  Location: bi-frontal  Radiation: posterior cervical  Frequency: baseline: 2-3/week, lately: daily  Duration: with treatment: \"a few hours\" at least  Headache Days/Month: baseline: 18/30, lately: 30/30  Quality: \"stabbing, pressure\"  Intensity: baseline: 8/10, lately: 6/10  Aura: visual (difficulty " focusing)  Photophobia/Phonophobia/Nausea/Vomiting: yes/yes/yes/no  Provoked by Physical Activity?:   Triggers: sleep deprivation  Associated Symptoms:   Autonomic Signs (such as ptosis, miosis, conjunctival injection, rhinorrhea, increased lacrimation):   Head Trauma:   Association with Menses:   ED Visits: none  Hospitalizations: none  Missed Work Days (unemployed): n/a  Sleep (hours/night): 5  Caffeine Intake: 3 cups of coffee/week maximum  Hydration: keeps well-hydrated  Nutrition: eats in the morning, sometimes lunch, sometimes dinner, doesn't get hungry  Exercise:   Analgesic Overuse:      Current Medication Regimen:  - Botox: helpful  - sumatriptan 25 mg/day is sometimes helpful, sometimes re-doses, seems to reduce duration of pain     Medications Tried: Response  Preventive:  - amitriptyline: 50 mg/night promotes sleep but doesn't seem to have an effect on headache symptoms  - topiramate: 50 mg is ineffective     Rescue:  -      Medications Not Tried:  - propranolol: would like to avoid this given baseline low blood pressure and tendency to feel faint  - topiramate:     MEDICATIONS:  Current Outpatient Medications   Medication Sig    acetaminophen (TYLENOL) 325 MG Tab Take 650 mg by mouth every four hours as needed.    eletriptan (RELPAX) 40 MG tablet Take 40 mg at the onset of aura/HA; may re-dose x1 after 2 hrs if HA persists; MDD: 80 mg; do not use >2 days/week.     MEDICAL, SOCIAL, AND FAMILY HISTORY:  There is no change in the patient's ROS or medical, social, or family histories since the previous visit on 2/12/2025.    REVIEW OF SYSTEMS:  A ROS was completed.  Pertinent positives and negatives were included in the HPI, above.  All other systems were reviewed and are negative.    PHYSICAL EXAM:  General/Medical:  - NAD    Neuro:  MENTAL STATUS: awake and alert; no deficits of speech or language; oriented to conversation; affect was appropriate to situation; pleasant, cooperative    CRANIAL NERVES:     II: acuity: NT, fields: NT, pupils: NT, discs: NT    III/IV/VI: versions: grossly intact    V: facial sensation: NT    VII: facial expression: symmetric    VIII: hearing: intact to voice    IX/X: palate: NT    XI: shoulder shrug: NT    XII: tongue: NT    MOTOR:  - bulk: NT  - tone: NT  Upper Extremity Strength (R/L)    NT   Elbow flexion NT   Elbow extension NT   Shoulder abduction NT     Lower Extremity Strength  (R/L)   Hip flexion NT   Knee extension NT   Knee flexion NT   Ankle dorsiflexion NT   Ankle plantarflexion NT     - heel-walk: NT  - toe-walk: NT  - pronator drift: absent  - abnormal movements: none    SENSATION:  - light touch: NT  - vibration (R/L, seconds): NT at the great toes  - pinprick: NT  - proprioception: NT  - Romberg: NT    COORDINATION:  - finger to nose: NT  - finger tapping: NT    REFLEXES:  Reflex Right Left   BR NT NT   Biceps NT NT   Triceps NT NT   Patellae NT NT   Achilles NT NT   Toes NT NT     GAIT:  - NT    QUANTITATIVE SCORES:  Timed 25-foot walk (sec): NT today (9.7 on 7/11/2024).  Assistive device: none    REVIEW OF IMAGING STUDIES:  No additional data since the last visit.    REVIEW OF LABORATORY STUDIES:  I have summarized pertinent data above.    ASSESSMENT:  Windy Funk is a 34 y.o. woman with MS and chronic migraine.  Plans/recommendations as follows:    PLAN:  MS:  - continue Ocrevus  - tentatively plan to repeat imaging after the next visit  Orders Placed This Encounter    MR-BRAIN-WITH & W/O    lymphocyte subsets (B & T cells)    IgA    IgG    IgM    Referral to Physical Therapy    acetaminophen (TYLENOL) 325 MG Tab    eletriptan (RELPAX) 40 MG tablet     Migraine:  - continue Botox  - stop sumatriptan   - trial of eletriptan    Follow-Up:  - Return in about 2 months (around 7/5/2025).    Signed: Ozzy Kim M.D.

## 2025-05-05 NOTE — Clinical Note
REFERRAL APPROVAL NOTICE         Sent on May 5, 2025                   Maribell Funk  [address] [address]                   Dear Ms. Funk,    After a careful review of the medical information and benefit coverage, [facility] has processed your referral. See below for additional details.    If applicable, you must be actively enrolled with your insurance for coverage of the authorized service. If you have any questions regarding your coverage, please contact your insurance directly.    REFERRAL INFORMATION   Referral #:  [id number]  Referred-To Department    Referred-By Provider:  Physical Therapy    Ozzy Kim M.D.   [facility]      [address]  [address]  [facility] [address]  [phone] [address]  [facility] [address]  [phone]    Referral Start Date:  05/05/2025  Referral End Date:   05/05/2026             SCHEDULING  If you do not already have an appointment, please call [phone] to make an appointment.     MORE INFORMATION  If you do not already have a [employer] account, sign up at: [email or web address]  You can access your medical information, make appointments, see lab results, billing information, and more.  If you have questions regarding this referral, please contact  the [facility] Referrals department at:             [phone]. Monday - Friday 8:00AM - 5:00PM.     Sincerely,    [facility]

## 2025-05-19 ENCOUNTER — APPOINTMENT (OUTPATIENT)
Dept: NEUROLOGY | Facility: MEDICAL CENTER | Age: 35
End: 2025-05-19
Payer: COMMERCIAL

## 2025-05-19 ENCOUNTER — TELEPHONE (OUTPATIENT)
Dept: NEUROLOGY | Facility: MEDICAL CENTER | Age: 35
End: 2025-05-19
Payer: COMMERCIAL

## 2025-05-19 ENCOUNTER — TELEPHONE (OUTPATIENT)
Dept: PHYSICAL MEDICINE AND REHAB | Facility: MEDICAL CENTER | Age: 35
End: 2025-05-19
Payer: COMMERCIAL

## 2025-05-19 NOTE — TELEPHONE ENCOUNTER
Patient called stating her Eletriptan was denied by ins.. Do we have a PA?  Best,  Tommy Stiles, Med Ass't

## 2025-05-19 NOTE — TELEPHONE ENCOUNTER
"Received [employer] PA request via \"Patient Call\"  for Eletriptan 40mg . (Quantity:12, Day Supply:30)     Insurance: [employer]  Member ID:  [id number]  BIN: [id number]  PCN: [id number]  Group: [id number]     Ran Test claim via [place] & medication Rejects stating prior authorization is required.      "

## 2025-05-19 NOTE — TELEPHONE ENCOUNTER
Prior Authorization for Eletriptan  (Quantity: 12, Days: 30) has been submitted via Cover My Meds: Key ([id number])    Insurance: [employer] [employer]     Will follow up in 24-48 business hours.

## 2025-05-22 NOTE — TELEPHONE ENCOUNTER
Prior Authorization for Eletriptan  has been denied for a quantity of 12 , day supply 30    Prior authorization was denied per the following: PT must fail a trial of ONE more preferred drug (the options include: Naratriptan, Rizatriptan).    Prior Authorization denial reference number: [id number]  Insurance: [facility]       Next Steps: Message provider & MA for the pa denial reason

## 2025-05-23 ENCOUNTER — OFFICE VISIT (OUTPATIENT)
Dept: NEUROLOGY | Facility: MEDICAL CENTER | Age: 35
End: 2025-05-23
Attending: PSYCHIATRY & NEUROLOGY
Payer: COMMERCIAL

## 2025-05-23 VITALS
SYSTOLIC BLOOD PRESSURE: 110 MMHG | DIASTOLIC BLOOD PRESSURE: 64 MMHG | WEIGHT: 153 LBS | TEMPERATURE: 98.4 F | RESPIRATION RATE: 16 BRPM | HEIGHT: 62 IN | OXYGEN SATURATION: 100 % | HEART RATE: 59 BPM | BODY MASS INDEX: 28.16 KG/M2

## 2025-05-23 DIAGNOSIS — G43.E09 CHRONIC MIGRAINE WITH AURA WITHOUT STATUS MIGRAINOSUS, NOT INTRACTABLE: Primary | ICD-10-CM

## 2025-05-23 PROCEDURE — 64615 CHEMODENERV MUSC MIGRAINE: CPT | Performed by: PSYCHIATRY & NEUROLOGY

## 2025-05-23 PROCEDURE — 3074F SYST BP LT 130 MM HG: CPT | Performed by: PSYCHIATRY & NEUROLOGY

## 2025-05-23 PROCEDURE — 700111 HCHG RX REV CODE 636 W/ 250 OVERRIDE (IP): Mod: UD

## 2025-05-23 PROCEDURE — 3078F DIAST BP <80 MM HG: CPT | Performed by: PSYCHIATRY & NEUROLOGY

## 2025-05-23 RX ORDER — ELETRIPTAN HYDROBROMIDE 40 MG/1
TABLET, FILM COATED ORAL
Qty: 12 TABLET | Refills: 11 | Status: SHIPPED
Start: 2025-05-23 | End: 2025-05-29

## 2025-05-23 RX ADMIN — ONABOTULINUMTOXINA 155 UNITS: 200 INJECTION, POWDER, LYOPHILIZED, FOR SOLUTION INTRADERMAL; INTRAMUSCULAR at 10:43

## 2025-05-23 ASSESSMENT — FIBROSIS 4 INDEX: FIB4 SCORE: 0.58

## 2025-05-23 ASSESSMENT — PATIENT HEALTH QUESTIONNAIRE - PHQ9
5. POOR APPETITE OR OVEREATING: 2 - MORE THAN HALF THE DAYS
SUM OF ALL RESPONSES TO PHQ QUESTIONS 1-9: 11
CLINICAL INTERPRETATION OF PHQ2 SCORE: 2

## 2025-05-28 ENCOUNTER — OUTPATIENT INFUSION SERVICES (OUTPATIENT)
Dept: ONCOLOGY | Facility: MEDICAL CENTER | Age: 35
End: 2025-05-28
Attending: PSYCHIATRY & NEUROLOGY
Payer: COMMERCIAL

## 2025-05-28 VITALS
TEMPERATURE: 97.7 F | HEIGHT: 62 IN | SYSTOLIC BLOOD PRESSURE: 112 MMHG | BODY MASS INDEX: 27.83 KG/M2 | DIASTOLIC BLOOD PRESSURE: 65 MMHG | HEART RATE: 67 BPM | RESPIRATION RATE: 18 BRPM | OXYGEN SATURATION: 99 % | WEIGHT: 151.24 LBS

## 2025-05-28 DIAGNOSIS — G35 MULTIPLE SCLEROSIS (HCC): Primary | ICD-10-CM

## 2025-05-28 PROCEDURE — 96366 THER/PROPH/DIAG IV INF ADDON: CPT

## 2025-05-28 PROCEDURE — A9270 NON-COVERED ITEM OR SERVICE: HCPCS | Mod: UD | Performed by: PSYCHIATRY & NEUROLOGY

## 2025-05-28 PROCEDURE — 96365 THER/PROPH/DIAG IV INF INIT: CPT

## 2025-05-28 PROCEDURE — 96413 CHEMO IV INFUSION 1 HR: CPT

## 2025-05-28 PROCEDURE — 700102 HCHG RX REV CODE 250 W/ 637 OVERRIDE(OP): Mod: UD | Performed by: PSYCHIATRY & NEUROLOGY

## 2025-05-28 PROCEDURE — 96375 TX/PRO/DX INJ NEW DRUG ADDON: CPT

## 2025-05-28 PROCEDURE — 96415 CHEMO IV INFUSION ADDL HR: CPT

## 2025-05-28 PROCEDURE — 700105 HCHG RX REV CODE 258: Mod: UD | Performed by: PSYCHIATRY & NEUROLOGY

## 2025-05-28 PROCEDURE — 700111 HCHG RX REV CODE 636 W/ 250 OVERRIDE (IP): Mod: JZ,UD | Performed by: PSYCHIATRY & NEUROLOGY

## 2025-05-28 RX ORDER — DIPHENHYDRAMINE HYDROCHLORIDE 50 MG/ML
50 INJECTION, SOLUTION INTRAMUSCULAR; INTRAVENOUS PRN
OUTPATIENT
Start: 2025-09-20

## 2025-05-28 RX ORDER — METHYLPREDNISOLONE SODIUM SUCCINATE 125 MG/2ML
125 INJECTION, POWDER, LYOPHILIZED, FOR SOLUTION INTRAMUSCULAR; INTRAVENOUS PRN
OUTPATIENT
Start: 2025-09-20

## 2025-05-28 RX ORDER — EPINEPHRINE 1 MG/ML(1)
0.5 AMPUL (ML) INJECTION PRN
OUTPATIENT
Start: 2025-09-20

## 2025-05-28 RX ORDER — 0.9 % SODIUM CHLORIDE 0.9 %
10 VIAL (ML) INJECTION PRN
OUTPATIENT
Start: 2025-09-20

## 2025-05-28 RX ORDER — DIPHENHYDRAMINE HCL 25 MG
50 TABLET ORAL ONCE
Status: DISCONTINUED | OUTPATIENT
Start: 2025-05-28 | End: 2025-05-28 | Stop reason: HOSPADM

## 2025-05-28 RX ORDER — 0.9 % SODIUM CHLORIDE 0.9 %
3 VIAL (ML) INJECTION PRN
OUTPATIENT
Start: 2025-09-20

## 2025-05-28 RX ORDER — ACETAMINOPHEN 325 MG/1
650 TABLET ORAL ONCE
Status: COMPLETED | OUTPATIENT
Start: 2025-05-28 | End: 2025-05-28

## 2025-05-28 RX ORDER — METHYLPREDNISOLONE SODIUM SUCCINATE 125 MG/2ML
100 INJECTION, POWDER, LYOPHILIZED, FOR SOLUTION INTRAMUSCULAR; INTRAVENOUS ONCE
Status: COMPLETED | OUTPATIENT
Start: 2025-05-28 | End: 2025-05-28

## 2025-05-28 RX ORDER — ACETAMINOPHEN 325 MG/1
650 TABLET ORAL ONCE
OUTPATIENT
Start: 2025-09-20

## 2025-05-28 RX ORDER — SODIUM CHLORIDE 9 MG/ML
INJECTION, SOLUTION INTRAVENOUS CONTINUOUS
OUTPATIENT
Start: 2025-09-20

## 2025-05-28 RX ORDER — METHYLPREDNISOLONE SODIUM SUCCINATE 125 MG/2ML
100 INJECTION, POWDER, LYOPHILIZED, FOR SOLUTION INTRAMUSCULAR; INTRAVENOUS ONCE
OUTPATIENT
Start: 2025-09-20

## 2025-05-28 RX ORDER — ONDANSETRON 2 MG/ML
8 INJECTION INTRAMUSCULAR; INTRAVENOUS EVERY 4 HOURS PRN
OUTPATIENT
Start: 2025-09-20

## 2025-05-28 RX ORDER — 0.9 % SODIUM CHLORIDE 0.9 %
VIAL (ML) INJECTION PRN
OUTPATIENT
Start: 2025-09-20

## 2025-05-28 RX ORDER — DIPHENHYDRAMINE HCL 25 MG
50 TABLET ORAL ONCE
OUTPATIENT
Start: 2025-09-20 | End: 2025-09-20

## 2025-05-28 RX ADMIN — METHYLPREDNISOLONE SODIUM SUCCINATE 100 MG: 125 INJECTION INTRAMUSCULAR; INTRAVENOUS at 11:10

## 2025-05-28 RX ADMIN — ACETAMINOPHEN 650 MG: 325 TABLET ORAL at 11:09

## 2025-05-28 RX ADMIN — OCRELIZUMAB 600 MG: 300 INJECTION INTRAVENOUS at 11:34

## 2025-05-28 ASSESSMENT — FIBROSIS 4 INDEX: FIB4 SCORE: 0.58

## 2025-05-28 NOTE — PROGRESS NOTES
Maribell arrives ambulatory to IS for Q6 month Ocrevus infusion.  She voices no complaints, reports tolerating past infusions well.  PIV placed to right AC, brisk blood return observed, flushes easily.  Pre-medication administered.  Ocrevus administered at subsequent rate,  followed by 1 hour observation period.  Maribell tolerated well, no s/s adverse reactions observed or verbalized.  PIV flushed and removed, gauze and coban to site.  Maribell discharged in NAD, next appointment scheduled via email.

## 2025-05-29 DIAGNOSIS — G43.E09 CHRONIC MIGRAINE WITH AURA WITHOUT STATUS MIGRAINOSUS, NOT INTRACTABLE: Primary | ICD-10-CM

## 2025-05-29 RX ORDER — RIZATRIPTAN BENZOATE 10 MG/1
TABLET ORAL
Qty: 12 TABLET | Refills: 11 | Status: SHIPPED | OUTPATIENT
Start: 2025-05-29 | End: 2026-05-29

## 2025-06-09 ENCOUNTER — HOSPITAL ENCOUNTER (OUTPATIENT)
Dept: RADIOLOGY | Facility: MEDICAL CENTER | Age: 35
End: 2025-06-09
Attending: PSYCHIATRY & NEUROLOGY
Payer: COMMERCIAL

## 2025-06-09 DIAGNOSIS — G35 MULTIPLE SCLEROSIS (HCC): ICD-10-CM

## 2025-06-09 PROCEDURE — A9579 GAD-BASE MR CONTRAST NOS,1ML: HCPCS | Mod: JZ,UD | Performed by: PSYCHIATRY & NEUROLOGY

## 2025-06-09 PROCEDURE — 70553 MRI BRAIN STEM W/O & W/DYE: CPT

## 2025-06-09 PROCEDURE — 700117 HCHG RX CONTRAST REV CODE 255: Mod: JZ,UD | Performed by: PSYCHIATRY & NEUROLOGY

## 2025-06-09 RX ORDER — GADOTERIDOL 279.3 MG/ML
15 INJECTION INTRAVENOUS ONCE
Status: COMPLETED | OUTPATIENT
Start: 2025-06-09 | End: 2025-06-09

## 2025-06-09 RX ADMIN — GADOTERIDOL 15 ML: 279.3 INJECTION, SOLUTION INTRAVENOUS at 07:36

## 2025-08-14 DIAGNOSIS — G35 MULTIPLE SCLEROSIS (HCC): Primary | ICD-10-CM

## 2025-08-18 ENCOUNTER — OFFICE VISIT (OUTPATIENT)
Dept: NEUROLOGY | Facility: MEDICAL CENTER | Age: 35
End: 2025-08-18
Attending: PSYCHIATRY & NEUROLOGY
Payer: COMMERCIAL

## 2025-08-18 VITALS
OXYGEN SATURATION: 99 % | HEIGHT: 62 IN | HEART RATE: 87 BPM | TEMPERATURE: 98.3 F | RESPIRATION RATE: 14 BRPM | WEIGHT: 148.81 LBS | SYSTOLIC BLOOD PRESSURE: 112 MMHG | BODY MASS INDEX: 27.38 KG/M2 | DIASTOLIC BLOOD PRESSURE: 76 MMHG

## 2025-08-18 DIAGNOSIS — G35 MULTIPLE SCLEROSIS (HCC): ICD-10-CM

## 2025-08-18 DIAGNOSIS — G43.E09 CHRONIC MIGRAINE WITH AURA WITHOUT STATUS MIGRAINOSUS, NOT INTRACTABLE: Primary | ICD-10-CM

## 2025-08-18 PROCEDURE — 3074F SYST BP LT 130 MM HG: CPT | Performed by: PSYCHIATRY & NEUROLOGY

## 2025-08-18 PROCEDURE — 700111 HCHG RX REV CODE 636 W/ 250 OVERRIDE (IP): Mod: UD

## 2025-08-18 PROCEDURE — 64615 CHEMODENERV MUSC MIGRAINE: CPT | Performed by: PSYCHIATRY & NEUROLOGY

## 2025-08-18 PROCEDURE — 3078F DIAST BP <80 MM HG: CPT | Performed by: PSYCHIATRY & NEUROLOGY

## 2025-08-18 RX ADMIN — ONABOTULINUMTOXINA 155 UNITS: 200 INJECTION, POWDER, LYOPHILIZED, FOR SOLUTION INTRADERMAL; INTRAMUSCULAR at 15:56

## 2025-08-18 ASSESSMENT — PATIENT HEALTH QUESTIONNAIRE - PHQ9
5. POOR APPETITE OR OVEREATING: 2 - MORE THAN HALF THE DAYS
CLINICAL INTERPRETATION OF PHQ2 SCORE: 2
SUM OF ALL RESPONSES TO PHQ QUESTIONS 1-9: 11

## 2025-08-18 ASSESSMENT — LIFESTYLE VARIABLES
SKIP TO QUESTIONS 9-10: 1
HOW OFTEN DO YOU HAVE SIX OR MORE DRINKS ON ONE OCCASION: NEVER
HOW OFTEN DO YOU HAVE A DRINK CONTAINING ALCOHOL: NEVER
AUDIT-C TOTAL SCORE: 0
HOW MANY STANDARD DRINKS CONTAINING ALCOHOL DO YOU HAVE ON A TYPICAL DAY: PATIENT DOES NOT DRINK

## 2025-08-18 ASSESSMENT — FIBROSIS 4 INDEX: FIB4 SCORE: 0.58

## 2025-08-26 DIAGNOSIS — Z00.6 CLINICAL TRIAL PARTICIPANT: ICD-10-CM

## 2025-08-26 DIAGNOSIS — G35 MULTIPLE SCLEROSIS (HCC): Primary | ICD-10-CM

## 2025-09-10 ENCOUNTER — APPOINTMENT (OUTPATIENT)
Dept: LAB | Facility: MEDICAL CENTER | Age: 35
End: 2025-09-10
Attending: FAMILY MEDICINE
Payer: COMMERCIAL